# Patient Record
Sex: MALE | Race: WHITE | NOT HISPANIC OR LATINO | URBAN - METROPOLITAN AREA
[De-identification: names, ages, dates, MRNs, and addresses within clinical notes are randomized per-mention and may not be internally consistent; named-entity substitution may affect disease eponyms.]

---

## 2017-06-08 ENCOUNTER — OUTPATIENT (OUTPATIENT)
Dept: OUTPATIENT SERVICES | Facility: HOSPITAL | Age: 67
LOS: 1 days | End: 2017-06-08
Payer: COMMERCIAL

## 2017-06-08 ENCOUNTER — APPOINTMENT (OUTPATIENT)
Dept: THORACIC SURGERY | Facility: CLINIC | Age: 67
End: 2017-06-08

## 2017-06-08 VITALS
WEIGHT: 184 LBS | SYSTOLIC BLOOD PRESSURE: 119 MMHG | HEART RATE: 89 BPM | DIASTOLIC BLOOD PRESSURE: 64 MMHG | RESPIRATION RATE: 18 BRPM | HEIGHT: 72 IN | BODY MASS INDEX: 24.92 KG/M2 | TEMPERATURE: 98.7 F | OXYGEN SATURATION: 96 %

## 2017-06-08 DIAGNOSIS — J90 PLEURAL EFFUSION, NOT ELSEWHERE CLASSIFIED: ICD-10-CM

## 2017-06-08 LAB
ALBUMIN SERPL ELPH-MCNC: 3.9 G/DL — SIGNIFICANT CHANGE UP (ref 3.3–5)
ALP SERPL-CCNC: 81 U/L — SIGNIFICANT CHANGE UP (ref 40–120)
ALT FLD-CCNC: 17 U/L — SIGNIFICANT CHANGE UP (ref 10–45)
ANION GAP SERPL CALC-SCNC: 13 MMOL/L — SIGNIFICANT CHANGE UP (ref 5–17)
APPEARANCE UR: CLEAR — SIGNIFICANT CHANGE UP
APTT BLD: 32.4 SEC — SIGNIFICANT CHANGE UP (ref 27.5–37.4)
AST SERPL-CCNC: 21 U/L — SIGNIFICANT CHANGE UP (ref 10–40)
BASOPHILS NFR BLD AUTO: 0.2 % — SIGNIFICANT CHANGE UP (ref 0–2)
BILIRUB SERPL-MCNC: 0.3 MG/DL — SIGNIFICANT CHANGE UP (ref 0.2–1.2)
BILIRUB UR-MCNC: NEGATIVE — SIGNIFICANT CHANGE UP
BLD GP AB SCN SERPL QL: NEGATIVE — SIGNIFICANT CHANGE UP
BLD GP AB SCN SERPL QL: NEGATIVE — SIGNIFICANT CHANGE UP
BUN SERPL-MCNC: 17 MG/DL — SIGNIFICANT CHANGE UP (ref 7–23)
CALCIUM SERPL-MCNC: 9.1 MG/DL — SIGNIFICANT CHANGE UP (ref 8.4–10.5)
CHLORIDE SERPL-SCNC: 104 MMOL/L — SIGNIFICANT CHANGE UP (ref 96–108)
CHOLEST SERPL-MCNC: 144 MG/DL — SIGNIFICANT CHANGE UP (ref 10–199)
CO2 SERPL-SCNC: 25 MMOL/L — SIGNIFICANT CHANGE UP (ref 22–31)
COLOR SPEC: YELLOW — SIGNIFICANT CHANGE UP
CREAT SERPL-MCNC: 1.3 MG/DL — SIGNIFICANT CHANGE UP (ref 0.5–1.3)
DIFF PNL FLD: (no result)
EOSINOPHIL NFR BLD AUTO: 3.6 % — SIGNIFICANT CHANGE UP (ref 0–6)
EPI CELLS # UR: SIGNIFICANT CHANGE UP /HPF
GLUCOSE SERPL-MCNC: 85 MG/DL — SIGNIFICANT CHANGE UP (ref 70–99)
GLUCOSE UR QL: NEGATIVE — SIGNIFICANT CHANGE UP
HBA1C BLD-MCNC: 5.4 % — SIGNIFICANT CHANGE UP (ref 4–5.6)
HBV SURFACE AG SER-ACNC: SIGNIFICANT CHANGE UP
HCT VFR BLD CALC: 40.6 % — SIGNIFICANT CHANGE UP (ref 39–50)
HDLC SERPL-MCNC: 50 MG/DL — SIGNIFICANT CHANGE UP (ref 40–125)
HGB BLD-MCNC: 13.3 G/DL — SIGNIFICANT CHANGE UP (ref 13–17)
INR BLD: 1.09 — SIGNIFICANT CHANGE UP (ref 0.88–1.16)
KETONES UR-MCNC: NEGATIVE — SIGNIFICANT CHANGE UP
LEUKOCYTE ESTERASE UR-ACNC: NEGATIVE — SIGNIFICANT CHANGE UP
LIPID PNL WITH DIRECT LDL SERPL: 76 MG/DL — SIGNIFICANT CHANGE UP
LYMPHOCYTES # BLD AUTO: 15.3 % — SIGNIFICANT CHANGE UP (ref 13–44)
MCHC RBC-ENTMCNC: 27.4 PG — SIGNIFICANT CHANGE UP (ref 27–34)
MCHC RBC-ENTMCNC: 32.8 G/DL — SIGNIFICANT CHANGE UP (ref 32–36)
MCV RBC AUTO: 83.7 FL — SIGNIFICANT CHANGE UP (ref 80–100)
MONOCYTES NFR BLD AUTO: 13.8 % — SIGNIFICANT CHANGE UP (ref 2–14)
NEUTROPHILS NFR BLD AUTO: 67.1 % — SIGNIFICANT CHANGE UP (ref 43–77)
NITRITE UR-MCNC: NEGATIVE — SIGNIFICANT CHANGE UP
PH UR: 5 — SIGNIFICANT CHANGE UP (ref 5–8)
PLATELET # BLD AUTO: 359 K/UL — SIGNIFICANT CHANGE UP (ref 150–400)
POTASSIUM SERPL-MCNC: 4.3 MMOL/L — SIGNIFICANT CHANGE UP (ref 3.5–5.3)
POTASSIUM SERPL-SCNC: 4.3 MMOL/L — SIGNIFICANT CHANGE UP (ref 3.5–5.3)
PROT SERPL-MCNC: 6.9 G/DL — SIGNIFICANT CHANGE UP (ref 6–8.3)
PROT UR-MCNC: NEGATIVE MG/DL — SIGNIFICANT CHANGE UP
PROTHROM AB SERPL-ACNC: 12.1 SEC — SIGNIFICANT CHANGE UP (ref 9.8–12.7)
RBC # BLD: 4.85 M/UL — SIGNIFICANT CHANGE UP (ref 4.2–5.8)
RBC # FLD: 14.3 % — SIGNIFICANT CHANGE UP (ref 10.3–16.9)
RBC CASTS # UR COMP ASSIST: < 5 /HPF — SIGNIFICANT CHANGE UP
RH IG SCN BLD-IMP: POSITIVE — SIGNIFICANT CHANGE UP
RH IG SCN BLD-IMP: POSITIVE — SIGNIFICANT CHANGE UP
SODIUM SERPL-SCNC: 142 MMOL/L — SIGNIFICANT CHANGE UP (ref 135–145)
SP GR SPEC: 1.02 — SIGNIFICANT CHANGE UP (ref 1–1.03)
TOTAL CHOLESTEROL/HDL RATIO MEASUREMENT: 2.9 RATIO — LOW (ref 3.4–9.6)
TRIGL SERPL-MCNC: 92 MG/DL — SIGNIFICANT CHANGE UP (ref 10–149)
TSH SERPL-MCNC: 5.52 UIU/ML — HIGH (ref 0.35–4.94)
UROBILINOGEN FLD QL: 0.2 E.U./DL — SIGNIFICANT CHANGE UP
WBC # BLD: 10.1 K/UL — SIGNIFICANT CHANGE UP (ref 3.8–10.5)
WBC # FLD AUTO: 10.1 K/UL — SIGNIFICANT CHANGE UP (ref 3.8–10.5)
WBC UR QL: < 5 /HPF — SIGNIFICANT CHANGE UP

## 2017-06-08 PROCEDURE — 86850 RBC ANTIBODY SCREEN: CPT

## 2017-06-08 PROCEDURE — 81001 URINALYSIS AUTO W/SCOPE: CPT

## 2017-06-08 PROCEDURE — 80053 COMPREHEN METABOLIC PANEL: CPT

## 2017-06-08 PROCEDURE — 85610 PROTHROMBIN TIME: CPT

## 2017-06-08 PROCEDURE — 86901 BLOOD TYPING SEROLOGIC RH(D): CPT

## 2017-06-08 PROCEDURE — 83036 HEMOGLOBIN GLYCOSYLATED A1C: CPT

## 2017-06-08 PROCEDURE — 85730 THROMBOPLASTIN TIME PARTIAL: CPT

## 2017-06-08 PROCEDURE — 93010 ELECTROCARDIOGRAM REPORT: CPT

## 2017-06-08 PROCEDURE — 86900 BLOOD TYPING SEROLOGIC ABO: CPT

## 2017-06-08 PROCEDURE — 80061 LIPID PANEL: CPT

## 2017-06-08 PROCEDURE — 85025 COMPLETE CBC W/AUTO DIFF WBC: CPT

## 2017-06-08 PROCEDURE — 93005 ELECTROCARDIOGRAM TRACING: CPT

## 2017-06-08 PROCEDURE — 84443 ASSAY THYROID STIM HORMONE: CPT

## 2017-06-08 PROCEDURE — 87340 HEPATITIS B SURFACE AG IA: CPT

## 2017-06-12 ENCOUNTER — INPATIENT (INPATIENT)
Facility: HOSPITAL | Age: 67
LOS: 5 days | Discharge: HOME | DRG: 164 | End: 2017-06-18
Attending: SPECIALIST | Admitting: SPECIALIST
Payer: COMMERCIAL

## 2017-06-12 ENCOUNTER — RESULT REVIEW (OUTPATIENT)
Age: 67
End: 2017-06-12

## 2017-06-12 VITALS
SYSTOLIC BLOOD PRESSURE: 116 MMHG | TEMPERATURE: 98 F | WEIGHT: 185.85 LBS | DIASTOLIC BLOOD PRESSURE: 82 MMHG | HEART RATE: 72 BPM | OXYGEN SATURATION: 99 % | RESPIRATION RATE: 18 BRPM

## 2017-06-12 DIAGNOSIS — J90 PLEURAL EFFUSION, NOT ELSEWHERE CLASSIFIED: ICD-10-CM

## 2017-06-12 DIAGNOSIS — Z01.818 ENCOUNTER FOR OTHER PREPROCEDURAL EXAMINATION: ICD-10-CM

## 2017-06-12 DIAGNOSIS — E78.5 HYPERLIPIDEMIA, UNSPECIFIED: ICD-10-CM

## 2017-06-12 DIAGNOSIS — Z96.641 PRESENCE OF RIGHT ARTIFICIAL HIP JOINT: Chronic | ICD-10-CM

## 2017-06-12 DIAGNOSIS — Z98.890 OTHER SPECIFIED POSTPROCEDURAL STATES: Chronic | ICD-10-CM

## 2017-06-12 DIAGNOSIS — Q27.30 ARTERIOVENOUS MALFORMATION, SITE UNSPECIFIED: ICD-10-CM

## 2017-06-12 DIAGNOSIS — I10 ESSENTIAL (PRIMARY) HYPERTENSION: ICD-10-CM

## 2017-06-12 DIAGNOSIS — R91.8 OTHER NONSPECIFIC ABNORMAL FINDING OF LUNG FIELD: ICD-10-CM

## 2017-06-12 LAB
ALBUMIN SERPL ELPH-MCNC: 3.5 G/DL — SIGNIFICANT CHANGE UP (ref 3.3–5)
ALP SERPL-CCNC: 71 U/L — SIGNIFICANT CHANGE UP (ref 40–120)
ALT FLD-CCNC: 16 U/L — SIGNIFICANT CHANGE UP (ref 10–45)
ANION GAP SERPL CALC-SCNC: 12 MMOL/L — SIGNIFICANT CHANGE UP (ref 5–17)
APPEARANCE UR: CLEAR — SIGNIFICANT CHANGE UP
APTT BLD: 33.2 SEC — SIGNIFICANT CHANGE UP (ref 27.5–37.4)
AST SERPL-CCNC: 22 U/L — SIGNIFICANT CHANGE UP (ref 10–40)
BASOPHILS NFR BLD AUTO: 0.2 % — SIGNIFICANT CHANGE UP (ref 0–2)
BILIRUB SERPL-MCNC: 0.6 MG/DL — SIGNIFICANT CHANGE UP (ref 0.2–1.2)
BILIRUB UR-MCNC: NEGATIVE — SIGNIFICANT CHANGE UP
BUN SERPL-MCNC: 19 MG/DL — SIGNIFICANT CHANGE UP (ref 7–23)
CALCIUM SERPL-MCNC: 9.2 MG/DL — SIGNIFICANT CHANGE UP (ref 8.4–10.5)
CHLORIDE SERPL-SCNC: 100 MMOL/L — SIGNIFICANT CHANGE UP (ref 96–108)
CHOLEST SERPL-MCNC: 141 MG/DL — SIGNIFICANT CHANGE UP (ref 10–199)
CK MB CFR SERPL CALC: 1.7 NG/ML — SIGNIFICANT CHANGE UP (ref 0–6.7)
CK SERPL-CCNC: 62 U/L — SIGNIFICANT CHANGE UP (ref 30–200)
CO2 SERPL-SCNC: 26 MMOL/L — SIGNIFICANT CHANGE UP (ref 22–31)
COLOR SPEC: YELLOW — SIGNIFICANT CHANGE UP
CREAT SERPL-MCNC: 1.2 MG/DL — SIGNIFICANT CHANGE UP (ref 0.5–1.3)
DIFF PNL FLD: (no result)
EOSINOPHIL NFR BLD AUTO: 3.6 % — SIGNIFICANT CHANGE UP (ref 0–6)
GLUCOSE FLD-MCNC: 71 MG/DL — SIGNIFICANT CHANGE UP
GLUCOSE SERPL-MCNC: 93 MG/DL — SIGNIFICANT CHANGE UP (ref 70–99)
GLUCOSE UR QL: NEGATIVE — SIGNIFICANT CHANGE UP
HCT VFR BLD CALC: 39 % — SIGNIFICANT CHANGE UP (ref 39–50)
HDLC SERPL-MCNC: 45 MG/DL — SIGNIFICANT CHANGE UP (ref 40–125)
HGB BLD-MCNC: 12.7 G/DL — LOW (ref 13–17)
INR BLD: 1.12 — SIGNIFICANT CHANGE UP (ref 0.88–1.16)
KETONES UR-MCNC: 15 MG/DL
LDH SERPL L TO P-CCNC: 577 U/L — SIGNIFICANT CHANGE UP
LEUKOCYTE ESTERASE UR-ACNC: NEGATIVE — SIGNIFICANT CHANGE UP
LIPID PNL WITH DIRECT LDL SERPL: 78 MG/DL — SIGNIFICANT CHANGE UP
LYMPHOCYTES # BLD AUTO: 13.2 % — SIGNIFICANT CHANGE UP (ref 13–44)
MCHC RBC-ENTMCNC: 27.4 PG — SIGNIFICANT CHANGE UP (ref 27–34)
MCHC RBC-ENTMCNC: 32.6 G/DL — SIGNIFICANT CHANGE UP (ref 32–36)
MCV RBC AUTO: 84.1 FL — SIGNIFICANT CHANGE UP (ref 80–100)
MONOCYTES NFR BLD AUTO: 13.9 % — SIGNIFICANT CHANGE UP (ref 2–14)
NEUTROPHILS NFR BLD AUTO: 69.1 % — SIGNIFICANT CHANGE UP (ref 43–77)
NITRITE UR-MCNC: NEGATIVE — SIGNIFICANT CHANGE UP
PH UR: 6.5 — SIGNIFICANT CHANGE UP (ref 5–8)
PLATELET # BLD AUTO: 344 K/UL — SIGNIFICANT CHANGE UP (ref 150–400)
POTASSIUM SERPL-MCNC: 4.1 MMOL/L — SIGNIFICANT CHANGE UP (ref 3.5–5.3)
POTASSIUM SERPL-SCNC: 4.1 MMOL/L — SIGNIFICANT CHANGE UP (ref 3.5–5.3)
PROT FLD-MCNC: 4.4 G/DL — SIGNIFICANT CHANGE UP
PROT SERPL-MCNC: 7 G/DL — SIGNIFICANT CHANGE UP (ref 6–8.3)
PROT UR-MCNC: NEGATIVE MG/DL — SIGNIFICANT CHANGE UP
PROTHROM AB SERPL-ACNC: 12.5 SEC — SIGNIFICANT CHANGE UP (ref 9.8–12.7)
RBC # BLD: 4.64 M/UL — SIGNIFICANT CHANGE UP (ref 4.2–5.8)
RBC # FLD: 14.2 % — SIGNIFICANT CHANGE UP (ref 10.3–16.9)
SODIUM SERPL-SCNC: 138 MMOL/L — SIGNIFICANT CHANGE UP (ref 135–145)
SP GR SPEC: 1.01 — SIGNIFICANT CHANGE UP (ref 1–1.03)
SPECIMEN SOURCE FLD: SIGNIFICANT CHANGE UP
TOTAL CHOLESTEROL/HDL RATIO MEASUREMENT: 3.1 RATIO — LOW (ref 3.4–9.6)
TRIGL SERPL-MCNC: 88 MG/DL — SIGNIFICANT CHANGE UP (ref 10–149)
TROPONIN T SERPL-MCNC: <0.01 NG/ML — SIGNIFICANT CHANGE UP (ref 0–0.01)
TSH SERPL-MCNC: 3.63 UIU/ML — SIGNIFICANT CHANGE UP (ref 0.35–4.94)
UROBILINOGEN FLD QL: 0.2 E.U./DL — SIGNIFICANT CHANGE UP
WBC # BLD: 8.5 K/UL — SIGNIFICANT CHANGE UP (ref 3.8–10.5)
WBC # FLD AUTO: 8.5 K/UL — SIGNIFICANT CHANGE UP (ref 3.8–10.5)

## 2017-06-12 PROCEDURE — 32557 INSERT CATH PLEURA W/ IMAGE: CPT | Mod: RT

## 2017-06-12 PROCEDURE — 71250 CT THORAX DX C-: CPT | Mod: 26

## 2017-06-12 PROCEDURE — 99223 1ST HOSP IP/OBS HIGH 75: CPT | Mod: GC

## 2017-06-12 PROCEDURE — 94010 BREATHING CAPACITY TEST: CPT | Mod: 26

## 2017-06-12 PROCEDURE — 71010: CPT | Mod: 26

## 2017-06-12 PROCEDURE — 99285 EMERGENCY DEPT VISIT HI MDM: CPT

## 2017-06-12 RX ORDER — OXYCODONE HYDROCHLORIDE 5 MG/1
5 TABLET ORAL ONCE
Qty: 0 | Refills: 0 | Status: DISCONTINUED | OUTPATIENT
Start: 2017-06-12 | End: 2017-06-13

## 2017-06-12 RX ORDER — OXYCODONE HYDROCHLORIDE 5 MG/1
5 TABLET ORAL ONCE
Qty: 0 | Refills: 0 | Status: DISCONTINUED | OUTPATIENT
Start: 2017-06-12 | End: 2017-06-12

## 2017-06-12 RX ORDER — CHLORHEXIDINE GLUCONATE 213 G/1000ML
1 SOLUTION TOPICAL ONCE
Qty: 0 | Refills: 0 | Status: COMPLETED | OUTPATIENT
Start: 2017-06-12 | End: 2017-06-13

## 2017-06-12 RX ORDER — CHLORHEXIDINE GLUCONATE 213 G/1000ML
10 SOLUTION TOPICAL ONCE
Qty: 0 | Refills: 0 | Status: DISCONTINUED | OUTPATIENT
Start: 2017-06-12 | End: 2017-06-13

## 2017-06-12 RX ORDER — KETOROLAC TROMETHAMINE 30 MG/ML
30 SYRINGE (ML) INJECTION EVERY 4 HOURS
Qty: 0 | Refills: 0 | Status: DISCONTINUED | OUTPATIENT
Start: 2017-06-12 | End: 2017-06-12

## 2017-06-12 RX ORDER — SODIUM CHLORIDE 9 MG/ML
3 INJECTION INTRAMUSCULAR; INTRAVENOUS; SUBCUTANEOUS EVERY 8 HOURS
Qty: 0 | Refills: 0 | Status: DISCONTINUED | OUTPATIENT
Start: 2017-06-12 | End: 2017-06-13

## 2017-06-12 RX ORDER — LOSARTAN POTASSIUM 100 MG/1
25 TABLET, FILM COATED ORAL DAILY
Qty: 0 | Refills: 0 | Status: DISCONTINUED | OUTPATIENT
Start: 2017-06-12 | End: 2017-06-13

## 2017-06-12 RX ORDER — ATORVASTATIN CALCIUM 80 MG/1
20 TABLET, FILM COATED ORAL AT BEDTIME
Qty: 0 | Refills: 0 | Status: DISCONTINUED | OUTPATIENT
Start: 2017-06-12 | End: 2017-06-13

## 2017-06-12 RX ORDER — LEVOTHYROXINE SODIUM 125 MCG
200 TABLET ORAL DAILY
Qty: 0 | Refills: 0 | Status: DISCONTINUED | OUTPATIENT
Start: 2017-06-12 | End: 2017-06-13

## 2017-06-12 RX ORDER — ACETAMINOPHEN 500 MG
650 TABLET ORAL EVERY 6 HOURS
Qty: 0 | Refills: 0 | Status: DISCONTINUED | OUTPATIENT
Start: 2017-06-12 | End: 2017-06-13

## 2017-06-12 RX ADMIN — Medication 650 MILLIGRAM(S): at 20:07

## 2017-06-12 RX ADMIN — OXYCODONE HYDROCHLORIDE 5 MILLIGRAM(S): 5 TABLET ORAL at 21:37

## 2017-06-12 RX ADMIN — ATORVASTATIN CALCIUM 20 MILLIGRAM(S): 80 TABLET, FILM COATED ORAL at 21:37

## 2017-06-12 RX ADMIN — Medication 650 MILLIGRAM(S): at 21:05

## 2017-06-12 RX ADMIN — SODIUM CHLORIDE 3 MILLILITER(S): 9 INJECTION INTRAMUSCULAR; INTRAVENOUS; SUBCUTANEOUS at 14:47

## 2017-06-12 RX ADMIN — SODIUM CHLORIDE 3 MILLILITER(S): 9 INJECTION INTRAMUSCULAR; INTRAVENOUS; SUBCUTANEOUS at 21:37

## 2017-06-12 RX ADMIN — OXYCODONE HYDROCHLORIDE 5 MILLIGRAM(S): 5 TABLET ORAL at 22:15

## 2017-06-12 NOTE — H&P ADULT - NSHPREVIEWOFSYSTEMS_GEN_ALL_CORE
Review of Systems  CONSTITUTIONAL:  Denies Fevers / chills, sweats, fatigue, weight loss, weight gain                                      NEURO:  Denies parathesias, seizures, syncope, confusion                                                                                EYES:  Denies Blurry vision, discharge, pain, loss of vision                                                                                    ENMT:  Denies Difficulty hearing, vertigo, dysphagia, epistaxis, recent dental work                                       CV:  Denies Chest pain, palpitations, BAUM, orthopnea                                                                                          RESPIRATORY:  Denies Wheezing, SOB, cough / sputum, hemoptysis                                                                GI:  Denies Nausea, vommiting, diarrhea, constipation, melena, difficulty swallowing                                               : Denies Hematuria, dysuria, urgency, incontinence                                                                                         MUSKULOSKELETAL:  Denies arthritis, joint swelling, muscle weakness                                                             SKIN/BREAST:  Denies rash, itching, liyah loss, masses                                                                                            PSYCH:  Denies depresion, anxiety, suicidal ideation                                                                                               HEME/LYMPH:  Denies bruises easily, enlarged lymph nodes, tender lymph nodes                                        ENDOCRINE:  Denies cold intolerance, heat intolerance, polydipsia;

## 2017-06-12 NOTE — H&P ADULT - HISTORY OF PRESENT ILLNESS
41 y/o male with PMHx HTN, HLD, hypothyroidism, and right kidney AVM post IR embolization years ago at an OSH (with 3 coils per patient) who suffered from a mechanical fall in October 2016. States he was walking in his house at nighttime in the dark, and didn't want to wake his wife so didn't turn the lights on.  He tripped down the stairs and broke 2 right ribs.  He was found to have a pleural effusion after the fall that was 'watched' for a while by his out-patient doctors.  He then [facility] which drained ~1330cc (per patient) and looked 'bloody' when he saw the container.  He was then discharged home and upon follow up was found to have recurrent pleural effusion that he was told is related to his fall.  His wife is present and tells me that he has a mild cough at times, and his voice sounds 'gurgly'.  He denies any SOB, chest pain or palpitations.  His activity is not particularly limited over the past few weeks/months, although he states his wife 'doesn't let him' go to the gym b/c of his pleural effusion.  So he has been taking it easy lately.  No symptoms upon basic walking or climbing stairs.  He came through the ED today for evaluation and possible admission for surgery.    ***Of note, he tells me that secondary to his kidney AVM, his doctor told him that he should NEVER take NSAIDs*** 43 y/o male with PMHx HTN, HLD, hypothyroidism, and right kidney AVM post IR embolization years ago at an OSH (with 3 coils per patient) who suffered from a mechanical fall in October 2016. States he was walking in his house at nighttime in the dark, and didn't want to wake his wife so didn't turn the lights on.  He tripped down the stairs and broke 2 right ribs.  He was found to have a pleural effusion after the fall that was 'watched' for a while by his out-patient doctors.  He then had a thoracentesis  at [facility] on 5/25/17 which drained ~1330cc (per patient) and looked 'bloody' when he saw the container.  He was then discharged home and upon follow up was found to have recurrent pleural effusion that he was told is related to his fall.  His wife is present and tells me that he has a mild cough at times, and his voice sounds 'gurgly'.  He denies any SOB, chest pain or palpitations.  His activity is not particularly limited over the past few weeks/months, although he states his wife 'doesn't let him' go to the gym b/c of his pleural effusion.  So he has been taking it easy lately.  No symptoms upon basic walking or climbing stairs.  He came through the ED today for evaluation and possible admission for surgery.    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  He completed a course of Doxycycline.

## 2017-06-12 NOTE — ED ADULT TRIAGE NOTE - CHIEF COMPLAINT QUOTE
Pt presents c/o worsening BAUM since yesterday. Referred to ED by MD Milian.  Denies any fever, chills, endorses productive cough and mild chest pain.

## 2017-06-12 NOTE — H&P ADULT - NSHPPHYSICALEXAM_GEN_ALL_CORE
Physical Exam  CONSTITUTIONAL:                                                     NAD, appears well.  Not in respiratory distress.  NEURO:                                                               No focal deficits                   EYES:                                                                                WNL  ENMT:                                                                               WNL  CV:                                                                               S1S2 regular, no murmur heard  RESPIRATORY:                                                              Clear on the left; Right side diminished right middle lung, almost absent RLL  GI:                                                                                  Soft, non tender  : CERDA + / -                                                                No  MUSKULOSKELETAL:                                                       WNL  SKIN / BREAST:                                                                  WNL  EXT: No peripheral edema; warm and well perfused.

## 2017-06-12 NOTE — H&P ADULT - NSHPSOCIALHISTORY_GEN_ALL_CORE
Denies past or current smoker; Drinks wine (mostly on the weekends). Denied illicit drug use.  Lives with his wife.  Active male (until recently)

## 2017-06-12 NOTE — ED PROVIDER NOTE - MEDICAL DECISION MAKING DETAILS
67 yo male here because of recurrent right pleural effusion.  to be admitted Dr Marroquin for drainage.  labs, ekg and cxr ordered.  pt stable, comfortable

## 2017-06-12 NOTE — CONSULT NOTE ADULT - PROBLEM SELECTOR RECOMMENDATION 6
The patient's medical condition is optimized for surgery.  There is no contraindication for surgery.  There is no clinical evidence neither of angina, decompensated CHF, arrhthymias, nor valvular disease.   There is no limitation of exercise capacity.  MET is 7 .  ASA class is 2.  Perdomo cardiac risk factor is low  .  DVT prophylaxis is indicated.  Pain control.  Early mobilization.  Avoid fluid overload.

## 2017-06-12 NOTE — ED ADULT NURSE NOTE - CHPI ED SYMPTOMS NEG
no vomiting/no cough/no back pain/no dizziness/no chest pain/no diaphoresis/no fever/no nausea/no syncope/no chills

## 2017-06-12 NOTE — PROGRESS NOTE ADULT - ASSESSMENT
65 y/o male with HTN, HLD, and recurrent pleural effusion possibly 2/2 mechanical fall 8 months ago.

## 2017-06-12 NOTE — ED ADULT NURSE NOTE - OBJECTIVE STATEMENT
67 y/o male c/o SOB worse on exertion. PMH pleural effusion with thoracentesis May 25, 2017. PMH HTN, HLD. no acute distress, speaking in full sentences without SOB, VS stable. denies chest pain, headache, n/v/d, fever/chills. resting comfortably in stretcher awaiting further evaluation.

## 2017-06-12 NOTE — H&P ADULT - PMH
AVM (arteriovenous malformation)  in the KIDNEY  Hip pain, chronic, right    HLD (hyperlipidemia)    HTN (hypertension)    Pleural effusion

## 2017-06-12 NOTE — H&P ADULT - PROBLEM SELECTOR PLAN 1
IR to place right pigtail for moderate-large pleural effusion.  Pt to have CT chest after his pigtail.  Then to the OR tomorrow for right VATS, drainage of pleural effusion, decortication, pleural biopsy.   NPO after midnight.  Pre-op orders/consent done.  Pre-op checklist to follow.  PFTs ordered.  Patient can eat after his pigtail.  Dr. Cheng asked to consult for medical clearance.

## 2017-06-12 NOTE — PROGRESS NOTE ADULT - SUBJECTIVE AND OBJECTIVE BOX
Planned Date of Surgery:  6/13/17                                                                                                                Surgeon: Dr. Jha    Procedure: Right VATS, drainage of pleural effusion, decortication, pleural biopsy    HPI:  66y Male with PMHx kidney AVM s/p coils, HLD, HTN, right hip replacement, had a mechanical fall in Oct 2016 and subsequently developed a pleural effusion.  Had a thoracentesis in May 2017 (1300cc per patient).  Now here wtih recurrent pleural effusion on right side which has been followed as an out patient. He was seen by Dr. Jha in the office and deemed a candidate for VATS.  He currently denies any SOB, chest pain, palpitations, F/C, N/V/D.      PAST MEDICAL & SURGICAL HISTORY:  Hip pain, chronic, right  AVM (arteriovenous malformation): in the KIDNEY  Pleural effusion  HLD (hyperlipidemia)  HTN (hypertension)  History of thoracentesis: May 2017  History of hip replacement, total, right      codeine (Rash)  NSAID (Nephrotoxicity)  sulfa drugs (Unknown)      MEDICATIONS  (STANDING):  sodium chloride 0.9% lock flush 3milliLiter(s) IV Push every 8 hours  losartan 25milliGRAM(s) Oral daily  atorvastatin 20milliGRAM(s) Oral at bedtime  levothyroxine 200MICROGram(s) Oral daily  chlorhexidine 4% Liquid 1Application(s) Topical once  chlorhexidine 0.12% Liquid 10milliLiter(s) Swish and Spit once    MEDICATIONS  (PRN):  acetaminophen   Tablet. 650milliGRAM(s) Oral every 6 hours PRN Mild Pain (1 - 3)      Labs:                        12.7   8.5   )-----------( 344      ( 12 Jun 2017 09:21 )             39.0     06-12    138  |  100  |  19  ----------------------------<  93  4.1   |  26  |  1.20    Ca    9.2      12 Jun 2017 09:21    TPro  7.0  /  Alb  3.5  /  TBili  0.6  /  DBili  x   /  AST  22  /  ALT  16  /  AlkPhos  71  06-12    PT/INR - ( 12 Jun 2017 09:21 )   PT: 12.5 sec;   INR: 1.12          PTT - ( 12 Jun 2017 09:21 )  PTT:33.2 sec        CARDIAC MARKERS ( 12 Jun 2017 09:21 )  x     / <0.01 ng/mL / 62 U/L / x     / 1.7 ng/mL          Hgb A1C: pending    EKG: NSR at 92 BPM. No BROOKE.    CXR: Right moderate/large pleural effusion.  No other abnormalities seen    CT Scans: CT chest pending this afternoon    Cath Report: Not required    Echo: To be done tomorrow am.     PFT's: Done (not reviewed yet, patient is off the floor    Consult in Chart?  No (H&P on chart)  Consent in Chart? YES  Pre-op Orders Placed? YES   Blood Prodeucts Ordered? YES   NPO ordered? YES

## 2017-06-12 NOTE — ED PROVIDER NOTE - OBJECTIVE STATEMENT
sob for past few days.  has known pleural effusion.  had it drained a few weeks ago.  thought to be from trauma, had a fall with rib fx a few months ago

## 2017-06-12 NOTE — H&P ADULT - ASSESSMENT
67 y/o male with a mechanical fall months ago with recurrent right pleural effusion.  Here for right IR U/S-guided pigtail today (pt NPO since 6am), then surgery tomorrow for VATS/Decort.

## 2017-06-13 ENCOUNTER — APPOINTMENT (OUTPATIENT)
Dept: THORACIC SURGERY | Facility: HOSPITAL | Age: 67
End: 2017-06-13

## 2017-06-13 ENCOUNTER — RESULT REVIEW (OUTPATIENT)
Age: 67
End: 2017-06-13

## 2017-06-13 LAB
ALBUMIN FLD-MCNC: 2.7 G/DL — SIGNIFICANT CHANGE UP
ANION GAP SERPL CALC-SCNC: 15 MMOL/L — SIGNIFICANT CHANGE UP (ref 5–17)
APTT BLD: 29.2 SEC — SIGNIFICANT CHANGE UP (ref 27.5–37.4)
B PERT IGG+IGM PNL SER: SIGNIFICANT CHANGE UP
BASE EXCESS BLDA CALC-SCNC: -2.3 MMOL/L — LOW (ref -2–3)
BASOPHILS NFR BLD AUTO: 0.1 % — SIGNIFICANT CHANGE UP (ref 0–2)
BLD GP AB SCN SERPL QL: NEGATIVE — SIGNIFICANT CHANGE UP
BUN SERPL-MCNC: 17 MG/DL — SIGNIFICANT CHANGE UP (ref 7–23)
CALCIUM SERPL-MCNC: 8.8 MG/DL — SIGNIFICANT CHANGE UP (ref 8.4–10.5)
CHLORIDE SERPL-SCNC: 100 MMOL/L — SIGNIFICANT CHANGE UP (ref 96–108)
CO2 SERPL-SCNC: 21 MMOL/L — LOW (ref 22–31)
COLOR FLD: SIGNIFICANT CHANGE UP
CREAT SERPL-MCNC: 1.2 MG/DL — SIGNIFICANT CHANGE UP (ref 0.5–1.3)
EOSINOPHIL # FLD: 7 % — SIGNIFICANT CHANGE UP
EOSINOPHIL NFR BLD AUTO: 0.4 % — SIGNIFICANT CHANGE UP (ref 0–6)
EXTRA SST TUBE: SIGNIFICANT CHANGE UP
FLUID INTAKE SUBSTANCE CLASS: SIGNIFICANT CHANGE UP
FLUID SEGMENTED GRANULOCYTES: 3 % — SIGNIFICANT CHANGE UP
GAS PNL BLDA: SIGNIFICANT CHANGE UP
GLUCOSE SERPL-MCNC: 128 MG/DL — HIGH (ref 70–99)
GRAM STN FLD: SIGNIFICANT CHANGE UP
HCO3 BLDA-SCNC: 22 MMOL/L — SIGNIFICANT CHANGE UP (ref 21–28)
HCT VFR BLD CALC: 38.6 % — LOW (ref 39–50)
HGB BLD-MCNC: 12.7 G/DL — LOW (ref 13–17)
INR BLD: 1.15 — SIGNIFICANT CHANGE UP (ref 0.88–1.16)
LYMPHOCYTES # BLD AUTO: 3.7 % — LOW (ref 13–44)
LYMPHOCYTES # FLD: 77 % — SIGNIFICANT CHANGE UP
MCHC RBC-ENTMCNC: 27.5 PG — SIGNIFICANT CHANGE UP (ref 27–34)
MCHC RBC-ENTMCNC: 32.9 G/DL — SIGNIFICANT CHANGE UP (ref 32–36)
MCV RBC AUTO: 83.7 FL — SIGNIFICANT CHANGE UP (ref 80–100)
MONOCYTES NFR BLD AUTO: 3.1 % — SIGNIFICANT CHANGE UP (ref 2–14)
MONOS+MACROS # FLD: 13 % — SIGNIFICANT CHANGE UP
NEUTROPHILS NFR BLD AUTO: 92.7 % — HIGH (ref 43–77)
PCO2 BLDA: 38 MMHG — SIGNIFICANT CHANGE UP (ref 35–48)
PH BLDA: 7.39 — SIGNIFICANT CHANGE UP (ref 7.35–7.45)
PLATELET # BLD AUTO: 353 K/UL — SIGNIFICANT CHANGE UP (ref 150–400)
PO2 BLDA: 152 MMHG — HIGH (ref 83–108)
POTASSIUM SERPL-MCNC: 4.5 MMOL/L — SIGNIFICANT CHANGE UP (ref 3.5–5.3)
POTASSIUM SERPL-SCNC: 4.5 MMOL/L — SIGNIFICANT CHANGE UP (ref 3.5–5.3)
PROTHROM AB SERPL-ACNC: 12.8 SEC — HIGH (ref 9.8–12.7)
RBC # BLD: 4.61 M/UL — SIGNIFICANT CHANGE UP (ref 4.2–5.8)
RBC # FLD: 14.1 % — SIGNIFICANT CHANGE UP (ref 10.3–16.9)
RCV VOL RI: HIGH /UL (ref 0–5)
RH IG SCN BLD-IMP: POSITIVE — SIGNIFICANT CHANGE UP
RH IG SCN BLD-IMP: POSITIVE — SIGNIFICANT CHANGE UP
SAO2 % BLDA: 99 % — SIGNIFICANT CHANGE UP (ref 95–100)
SODIUM SERPL-SCNC: 136 MMOL/L — SIGNIFICANT CHANGE UP (ref 135–145)
SPECIMEN SOURCE FLD: SIGNIFICANT CHANGE UP
SPECIMEN SOURCE: SIGNIFICANT CHANGE UP
TOTAL NUCLEATED CELL COUNT, BODY FLUID: 2760 /UL — HIGH (ref 0–5)
TUBE TYPE: SIGNIFICANT CHANGE UP
WBC # BLD: 13.6 K/UL — HIGH (ref 3.8–10.5)
WBC # FLD AUTO: 13.6 K/UL — HIGH (ref 3.8–10.5)

## 2017-06-13 PROCEDURE — 71010: CPT | Mod: 26

## 2017-06-13 PROCEDURE — 93010 ELECTROCARDIOGRAM REPORT: CPT

## 2017-06-13 PROCEDURE — 93306 TTE W/DOPPLER COMPLETE: CPT | Mod: 26

## 2017-06-13 RX ORDER — HEPARIN SODIUM 5000 [USP'U]/ML
5000 INJECTION INTRAVENOUS; SUBCUTANEOUS EVERY 8 HOURS
Qty: 0 | Refills: 0 | Status: DISCONTINUED | OUTPATIENT
Start: 2017-06-13 | End: 2017-06-18

## 2017-06-13 RX ORDER — OXYCODONE HYDROCHLORIDE 5 MG/1
10 TABLET ORAL EVERY 12 HOURS
Qty: 0 | Refills: 0 | Status: DISCONTINUED | OUTPATIENT
Start: 2017-06-13 | End: 2017-06-14

## 2017-06-13 RX ORDER — SODIUM CHLORIDE 9 MG/ML
1000 INJECTION, SOLUTION INTRAVENOUS
Qty: 0 | Refills: 0 | Status: DISCONTINUED | OUTPATIENT
Start: 2017-06-13 | End: 2017-06-15

## 2017-06-13 RX ORDER — MORPHINE SULFATE 50 MG/1
4 CAPSULE, EXTENDED RELEASE ORAL EVERY 4 HOURS
Qty: 0 | Refills: 0 | Status: DISCONTINUED | OUTPATIENT
Start: 2017-06-13 | End: 2017-06-14

## 2017-06-13 RX ORDER — ALBUMIN HUMAN 25 %
100 VIAL (ML) INTRAVENOUS ONCE
Qty: 0 | Refills: 0 | Status: DISCONTINUED | OUTPATIENT
Start: 2017-06-13 | End: 2017-06-14

## 2017-06-13 RX ORDER — ALBUMIN HUMAN 25 %
500 VIAL (ML) INTRAVENOUS ONCE
Qty: 0 | Refills: 0 | Status: DISCONTINUED | OUTPATIENT
Start: 2017-06-13 | End: 2017-06-14

## 2017-06-13 RX ORDER — FAMOTIDINE 10 MG/ML
20 INJECTION INTRAVENOUS DAILY
Qty: 0 | Refills: 0 | Status: DISCONTINUED | OUTPATIENT
Start: 2017-06-13 | End: 2017-06-14

## 2017-06-13 RX ORDER — ACETAMINOPHEN 500 MG
1000 TABLET ORAL ONCE
Qty: 0 | Refills: 0 | Status: COMPLETED | OUTPATIENT
Start: 2017-06-13 | End: 2017-06-13

## 2017-06-13 RX ADMIN — FAMOTIDINE 20 MILLIGRAM(S): 10 INJECTION INTRAVENOUS at 18:18

## 2017-06-13 RX ADMIN — OXYCODONE HYDROCHLORIDE 5 MILLIGRAM(S): 5 TABLET ORAL at 03:00

## 2017-06-13 RX ADMIN — Medication 200 MICROGRAM(S): at 06:19

## 2017-06-13 RX ADMIN — CHLORHEXIDINE GLUCONATE 1 APPLICATION(S): 213 SOLUTION TOPICAL at 10:53

## 2017-06-13 RX ADMIN — Medication 400 MILLIGRAM(S): at 18:13

## 2017-06-13 RX ADMIN — OXYCODONE HYDROCHLORIDE 5 MILLIGRAM(S): 5 TABLET ORAL at 01:18

## 2017-06-13 RX ADMIN — Medication 1000 MILLIGRAM(S): at 20:10

## 2017-06-13 RX ADMIN — HEPARIN SODIUM 5000 UNIT(S): 5000 INJECTION INTRAVENOUS; SUBCUTANEOUS at 22:00

## 2017-06-13 RX ADMIN — SODIUM CHLORIDE 3 MILLILITER(S): 9 INJECTION INTRAMUSCULAR; INTRAVENOUS; SUBCUTANEOUS at 06:19

## 2017-06-13 RX ADMIN — LOSARTAN POTASSIUM 25 MILLIGRAM(S): 100 TABLET, FILM COATED ORAL at 06:22

## 2017-06-13 NOTE — PROGRESS NOTE ADULT - ASSESSMENT
65 y/o M with recurrent R pleural effusions s/p R VATS RA decortication   - keep chest tubes to suction   - f/u repeat CXR in AM   - c/w current pain regimen   - pepcid for GI ppx   - HSQ/SCD for DVT ppx   - zarco out in AM   - ADAT

## 2017-06-13 NOTE — PROGRESS NOTE ADULT - SUBJECTIVE AND OBJECTIVE BOX
Patient discussed on morning rounds with       Operation / Date:     SUBJECTIVE ASSESSMENT:  66y Male         Vital Signs Last 24 Hrs  T(C): 36.3, Max: 37.3 ( @ 19:48)  T(F): 97.3, Max: 99.2 ( @ 19:48)  HR: 78 (56 - 85)  BP: 113/71 (102/75 - 125/65)  BP(mean): 85 (85 - 90)  RR: 18 (12 - 18)  SpO2: 100% (94% - 100%)  I&O's Detail    I & Os for current day (as of 2017 18:04)  =============================================  IN:    Total IN: 0 ml  ---------------------------------------------  OUT:    Chest Tube: 310 ml    Total OUT: 310 ml  ---------------------------------------------  Total NET: -310 ml      CHEST TUBE:  Yes/No. AIR LEAKS: Yes/No. Suction / H2O SEAL.   SEAN DRAIN:  Yes/No.  EPICARDIAL WIRES: Yes/No.  TIE DOWNS: Yes/No.  CERDA: Yes/No.    PHYSICAL EXAM:    General:     Neurological:    Cardiovascular:    Respiratory:    Gastrointestinal:    Extremities:    Vascular:    Incision Sites:    LABS:                        12.7   13.6  )-----------( 353      ( 2017 17:15 )             38.6       COUMADIN:  Yes/No. REASON: .    PT/INR - ( 2017 17:15 )   PT: 12.8 sec;   INR: 1.15          PTT - ( 2017 17:15 )  PTT:29.2 sec        136  |  100  |  17  ----------------------------<  128<H>  4.5   |  21<L>  |  1.20    Ca    8.8      2017 17:15    TPro  7.0  /  Alb  3.5  /  TBili  0.6  /  DBili  x   /  AST  22  /  ALT  16  /  AlkPhos  71  06-12      Urinalysis Basic - ( 2017 18:06 )    Color: Yellow / Appearance: Clear / S.010 / pH: x  Gluc: x / Ketone: 15 mg/dL  / Bili: NEGATIVE / Urobili: 0.2 E.U./dL   Blood: x / Protein: NEGATIVE mg/dL / Nitrite: NEGATIVE   Leuk Esterase: NEGATIVE / RBC: < 5 /HPF / WBC < 5 /HPF   Sq Epi: x / Non Sq Epi: Rare /HPF / Bacteria: Present /HPF        MEDICATIONS  (STANDING):  albumin human  5% IVPB 500milliLiter(s) IV Intermittent once  albumin human 25% IVPB 100milliLiter(s) IV Intermittent once  lactated ringers. 1000milliLiter(s) IV Continuous <Continuous>  famotidine Injectable 20milliGRAM(s) IV Push daily  oxyCODONE ER Tablet 10milliGRAM(s) Oral every 12 hours  acetaminophen  IVPB. 1000milliGRAM(s) IV Intermittent once  heparin  Injectable 5000Unit(s) SubCutaneous every 8 hours    MEDICATIONS  (PRN):  morphine  - Injectable 4milliGRAM(s) IV Push every 4 hours PRN Severe Pain (7 - 10)        RADIOLOGY & ADDITIONAL TESTS: Patient discussed on morning rounds with Dr. Jha     Operation / Date: R VATS RA decortication    SUBJECTIVE ASSESSMENT:    Pt reports pain well controlled after a dose of IV tylenol.  He feels "bubbling" in chest but denies dyspnea or SOB.  He is pulling up to 1500 on IS and has no other present concerns.    Vital Signs Last 24 Hrs  T(C): 36.3, Max: 37.3 ( @ 19:48)  T(F): 97.3, Max: 99.2 ( @ 19:48)  HR: 78 (56 - 85)  BP: 113/71 (102/75 - 125/65)  BP(mean): 85 (85 - 90)  RR: 18 (12 - 18)  SpO2: 100% (94% - 100%)  I&O's Detail    I & Os for current day (as of 2017 18:04)  =============================================  IN:    Total IN: 0 ml  ---------------------------------------------  OUT:    Chest Tube: 310 ml    Total OUT: 310 ml  ---------------------------------------------  Total NET: -310 ml      CHEST TUBE:  Yes. AIR LEAKS: Yes on Suction   SEAN DRAIN:  No.  EPICARDIAL WIRES: No.  TIE DOWNS: No.  CERDA: Yes.    PHYSICAL EXAM:    General: NAD    Neurological: A&Ox3     Cardiovascular: S1S2 RRR    Respiratory: CTA b/l no W/R/R    Gastrointestinal: soft NT/ND +BS    Extremities: no edema    Vascular: warm and well perfused bilaterally    Incision Sites: R VATS incisions C/D/I    LABS:                        12.7   13.6  )-----------( 353      ( 2017 17:15 )             38.6       COUMADIN:  No.     PT/INR - ( 2017 17:15 )   PT: 12.8 sec;   INR: 1.15          PTT - ( 2017 17:15 )  PTT:29.2 sec        136  |  100  |  17  ----------------------------<  128<H>  4.5   |  21<L>  |  1.20    Ca    8.8      2017 17:15    TPro  7.0  /  Alb  3.5  /  TBili  0.6  /  DBili  x   /  AST  22  /  ALT  16  /  AlkPhos  71  06-12      Urinalysis Basic - ( 2017 18:06 )    Color: Yellow / Appearance: Clear / S.010 / pH: x  Gluc: x / Ketone: 15 mg/dL  / Bili: NEGATIVE / Urobili: 0.2 E.U./dL   Blood: x / Protein: NEGATIVE mg/dL / Nitrite: NEGATIVE   Leuk Esterase: NEGATIVE / RBC: < 5 /HPF / WBC < 5 /HPF   Sq Epi: x / Non Sq Epi: Rare /HPF / Bacteria: Present /HPF        MEDICATIONS  (STANDING):  albumin human  5% IVPB 500milliLiter(s) IV Intermittent once  albumin human 25% IVPB 100milliLiter(s) IV Intermittent once  lactated ringers. 1000milliLiter(s) IV Continuous <Continuous>  famotidine Injectable 20milliGRAM(s) IV Push daily  oxyCODONE ER Tablet 10milliGRAM(s) Oral every 12 hours  acetaminophen  IVPB. 1000milliGRAM(s) IV Intermittent once  heparin  Injectable 5000Unit(s) SubCutaneous every 8 hours    MEDICATIONS  (PRN):  morphine  - Injectable 4milliGRAM(s) IV Push every 4 hours PRN Severe Pain (7 - 10)        RADIOLOGY & ADDITIONAL TESTS: no effusions/infiltrates/PTX

## 2017-06-13 NOTE — PACU DISCHARGE NOTE - COMMENTS
pt aao x3.  VSS.  right chest tube 28fr x2 to sxn with sanguinous drainage.  denies c/o pain or SOB at present.  report given to RN Francine on9 lachman.  pt to go to [phone]-window via bed on monitor with RN transport

## 2017-06-14 LAB
ANION GAP SERPL CALC-SCNC: 15 MMOL/L — SIGNIFICANT CHANGE UP (ref 5–17)
APTT BLD: 29.6 SEC — SIGNIFICANT CHANGE UP (ref 27.5–37.4)
BUN SERPL-MCNC: 18 MG/DL — SIGNIFICANT CHANGE UP (ref 7–23)
CALCIUM SERPL-MCNC: 9.9 MG/DL — SIGNIFICANT CHANGE UP (ref 8.4–10.5)
CHLORIDE SERPL-SCNC: 96 MMOL/L — SIGNIFICANT CHANGE UP (ref 96–108)
CO2 SERPL-SCNC: 26 MMOL/L — SIGNIFICANT CHANGE UP (ref 22–31)
CREAT SERPL-MCNC: 1.2 MG/DL — SIGNIFICANT CHANGE UP (ref 0.5–1.3)
CULTURE RESULTS: NO GROWTH — SIGNIFICANT CHANGE UP
GLUCOSE SERPL-MCNC: 92 MG/DL — SIGNIFICANT CHANGE UP (ref 70–99)
HCT VFR BLD CALC: 41.7 % — SIGNIFICANT CHANGE UP (ref 39–50)
HGB BLD-MCNC: 14 G/DL — SIGNIFICANT CHANGE UP (ref 13–17)
INR BLD: 1.1 — SIGNIFICANT CHANGE UP (ref 0.88–1.16)
MAGNESIUM SERPL-MCNC: 2.1 MG/DL — SIGNIFICANT CHANGE UP (ref 1.6–2.6)
MCHC RBC-ENTMCNC: 28.4 PG — SIGNIFICANT CHANGE UP (ref 27–34)
MCHC RBC-ENTMCNC: 33.6 G/DL — SIGNIFICANT CHANGE UP (ref 32–36)
MCV RBC AUTO: 84.6 FL — SIGNIFICANT CHANGE UP (ref 80–100)
NON-GYNECOLOGICAL CYTOLOGY STUDY: SIGNIFICANT CHANGE UP
PHOSPHATE SERPL-MCNC: 2.8 MG/DL — SIGNIFICANT CHANGE UP (ref 2.5–4.5)
PLATELET # BLD AUTO: 392 K/UL — SIGNIFICANT CHANGE UP (ref 150–400)
POTASSIUM SERPL-MCNC: 4.2 MMOL/L — SIGNIFICANT CHANGE UP (ref 3.5–5.3)
POTASSIUM SERPL-SCNC: 4.2 MMOL/L — SIGNIFICANT CHANGE UP (ref 3.5–5.3)
PROTHROM AB SERPL-ACNC: 12.2 SEC — SIGNIFICANT CHANGE UP (ref 9.8–12.7)
RBC # BLD: 4.93 M/UL — SIGNIFICANT CHANGE UP (ref 4.2–5.8)
RBC # FLD: 14.2 % — SIGNIFICANT CHANGE UP (ref 10.3–16.9)
SODIUM SERPL-SCNC: 137 MMOL/L — SIGNIFICANT CHANGE UP (ref 135–145)
SPECIMEN SOURCE: SIGNIFICANT CHANGE UP
WBC # BLD: 20.1 K/UL — HIGH (ref 3.8–10.5)
WBC # FLD AUTO: 20.1 K/UL — HIGH (ref 3.8–10.5)

## 2017-06-14 PROCEDURE — 71010: CPT | Mod: 26

## 2017-06-14 PROCEDURE — 99233 SBSQ HOSP IP/OBS HIGH 50: CPT | Mod: GC

## 2017-06-14 RX ORDER — SENNA PLUS 8.6 MG/1
2 TABLET ORAL AT BEDTIME
Qty: 0 | Refills: 0 | Status: DISCONTINUED | OUTPATIENT
Start: 2017-06-14 | End: 2017-06-18

## 2017-06-14 RX ORDER — OXYCODONE HYDROCHLORIDE 5 MG/1
5 TABLET ORAL EVERY 6 HOURS
Qty: 0 | Refills: 0 | Status: DISCONTINUED | OUTPATIENT
Start: 2017-06-14 | End: 2017-06-18

## 2017-06-14 RX ORDER — POLYETHYLENE GLYCOL 3350 17 G/17G
17 POWDER, FOR SOLUTION ORAL DAILY
Qty: 0 | Refills: 0 | Status: DISCONTINUED | OUTPATIENT
Start: 2017-06-14 | End: 2017-06-18

## 2017-06-14 RX ORDER — ACETAMINOPHEN 500 MG
650 TABLET ORAL EVERY 6 HOURS
Qty: 0 | Refills: 0 | Status: DISCONTINUED | OUTPATIENT
Start: 2017-06-14 | End: 2017-06-18

## 2017-06-14 RX ORDER — LEVOTHYROXINE SODIUM 125 MCG
200 TABLET ORAL DAILY
Qty: 0 | Refills: 0 | Status: DISCONTINUED | OUTPATIENT
Start: 2017-06-14 | End: 2017-06-18

## 2017-06-14 RX ORDER — ATORVASTATIN CALCIUM 80 MG/1
40 TABLET, FILM COATED ORAL AT BEDTIME
Qty: 0 | Refills: 0 | Status: DISCONTINUED | OUTPATIENT
Start: 2017-06-14 | End: 2017-06-14

## 2017-06-14 RX ORDER — OXYCODONE HYDROCHLORIDE 5 MG/1
10 TABLET ORAL EVERY 6 HOURS
Qty: 0 | Refills: 0 | Status: DISCONTINUED | OUTPATIENT
Start: 2017-06-14 | End: 2017-06-18

## 2017-06-14 RX ORDER — ACETAMINOPHEN 500 MG
650 TABLET ORAL ONCE
Qty: 0 | Refills: 0 | Status: COMPLETED | OUTPATIENT
Start: 2017-06-14 | End: 2017-06-14

## 2017-06-14 RX ORDER — DOCUSATE SODIUM 100 MG
100 CAPSULE ORAL THREE TIMES A DAY
Qty: 0 | Refills: 0 | Status: DISCONTINUED | OUTPATIENT
Start: 2017-06-14 | End: 2017-06-18

## 2017-06-14 RX ORDER — PANTOPRAZOLE SODIUM 20 MG/1
40 TABLET, DELAYED RELEASE ORAL
Qty: 0 | Refills: 0 | Status: DISCONTINUED | OUTPATIENT
Start: 2017-06-14 | End: 2017-06-18

## 2017-06-14 RX ADMIN — OXYCODONE HYDROCHLORIDE 10 MILLIGRAM(S): 5 TABLET ORAL at 06:30

## 2017-06-14 RX ADMIN — OXYCODONE HYDROCHLORIDE 10 MILLIGRAM(S): 5 TABLET ORAL at 05:53

## 2017-06-14 RX ADMIN — HEPARIN SODIUM 5000 UNIT(S): 5000 INJECTION INTRAVENOUS; SUBCUTANEOUS at 05:53

## 2017-06-14 RX ADMIN — Medication 650 MILLIGRAM(S): at 06:00

## 2017-06-14 RX ADMIN — OXYCODONE HYDROCHLORIDE 5 MILLIGRAM(S): 5 TABLET ORAL at 22:05

## 2017-06-14 RX ADMIN — HEPARIN SODIUM 5000 UNIT(S): 5000 INJECTION INTRAVENOUS; SUBCUTANEOUS at 21:55

## 2017-06-14 RX ADMIN — PANTOPRAZOLE SODIUM 40 MILLIGRAM(S): 20 TABLET, DELAYED RELEASE ORAL at 11:50

## 2017-06-14 RX ADMIN — SENNA PLUS 2 TABLET(S): 8.6 TABLET ORAL at 21:55

## 2017-06-14 RX ADMIN — Medication 100 MILLIGRAM(S): at 14:02

## 2017-06-14 RX ADMIN — Medication 200 MICROGRAM(S): at 14:39

## 2017-06-14 RX ADMIN — OXYCODONE HYDROCHLORIDE 10 MILLIGRAM(S): 5 TABLET ORAL at 22:35

## 2017-06-14 RX ADMIN — OXYCODONE HYDROCHLORIDE 5 MILLIGRAM(S): 5 TABLET ORAL at 22:02

## 2017-06-14 RX ADMIN — Medication 100 MILLIGRAM(S): at 21:55

## 2017-06-14 RX ADMIN — HEPARIN SODIUM 5000 UNIT(S): 5000 INJECTION INTRAVENOUS; SUBCUTANEOUS at 14:02

## 2017-06-14 RX ADMIN — Medication 325 MILLIGRAM(S): at 05:00

## 2017-06-14 NOTE — PROGRESS NOTE ADULT - SUBJECTIVE AND OBJECTIVE BOX
Patient discussed on morning rounds with Dr. Jha       Operation / Date: 17 Right VATS decortiaction    SUBJECTIVE ASSESSMENT:  Patient feeling well with minimal discomfort.  Feeling numbess just medial to VATS incisions and CT sites.  Tolerating clear diet this am. Denies HA, dizziness, CP, SOB, cough, palpitations, N/V/D/C        Vital Signs Last 24 Hrs  T(C): 36.8, Max: 37.3 ( @ 19:29)  T(F): 98.2, Max: 99.1 ( @ 19:29)  HR: 102 (14 - 102)  BP: 125/72 (100/75 - 125/72)  BP(mean): 99 (85 - 99)  RR: 17 (12 - 24)  SpO2: 98% (98% - 100%)  I&O's Detail  I & Os for 24h ending 2017 07:00  =============================================  IN:    Oral Fluid: 120 ml    lactated ringers.: 90 ml    Total IN: 210 ml  ---------------------------------------------  OUT:    Voided: 750 ml    Chest Tube: 200 ml    Chest Tube: 30 ml    Total OUT: 980 ml  ---------------------------------------------  Total NET: -770 ml    I & Os for current day (as of 2017 16:27)  =============================================  IN:    Total IN: 0 ml  ---------------------------------------------  OUT:    Voided: 950 ml    Chest Tube: 32 ml    Chest Tube: 10 ml    Total OUT: 992 ml  ---------------------------------------------  Total NET: -992 ml      CHEST TUBE:  Yes AIR LEAKS: Yes. Suction at -20 mmHg  SEAN DRAIN:  No.  EPICARDIAL WIRES: No.  TIE DOWNS: No.  CERDA: No.    PHYSICAL EXAM:    General: OOB in chair, comfortable.  NAD     Neurological: A&O x 3 moves all extremities, no focal deficits.      Cardiovascular: S1S2 RRR No M/G/R    Respiratory: crackles at right base.  Left lung CTA, no w/r/r.  No palpable crepitus     Gastrointestinal: BS x 4 abdomen soft, NT/ND    Extremities: No LE edema b/l.  radial and DP pulses 2+ b/l     Incision Sites: Right VATs incisions and chest tube sites clean no drainage, erythema.      LABS:                        14.0   20.1  )-----------( 392      ( 2017 11:44 )             41.7       COUMADIN:  No.     PT/INR - ( 2017 11:44 )   PT: 12.2 sec;   INR: 1.10          PTT - ( 2017 11:44 )  PTT:29.6 sec        137  |  96  |  18  ----------------------------<  92  4.2   |  26  |  1.20    Ca    9.9      2017 11:44  Phos  2.8     06-14  Mg     2.1     06-14        Urinalysis Basic - ( 2017 18:06 )    Color: Yellow / Appearance: Clear / S.010 / pH: x  Gluc: x / Ketone: 15 mg/dL  / Bili: NEGATIVE / Urobili: 0.2 E.U./dL   Blood: x / Protein: NEGATIVE mg/dL / Nitrite: NEGATIVE   Leuk Esterase: NEGATIVE / RBC: < 5 /HPF / WBC < 5 /HPF   Sq Epi: x / Non Sq Epi: Rare /HPF / Bacteria: Present /HPF        MEDICATIONS  (STANDING):  lactated ringers. 1000milliLiter(s) IV Continuous <Continuous>  heparin  Injectable 5000Unit(s) SubCutaneous every 8 hours  pantoprazole    Tablet 40milliGRAM(s) Oral before breakfast  docusate sodium 100milliGRAM(s) Oral three times a day  senna 2Tablet(s) Oral at bedtime  atorvastatin 40milliGRAM(s) Oral at bedtime  levothyroxine 200MICROGram(s) Oral daily    MEDICATIONS  (PRN):  acetaminophen   Tablet. 650milliGRAM(s) Oral every 6 hours PRN Mild Pain (1 - 3)  oxyCODONE IR 5milliGRAM(s) Oral every 6 hours PRN Moderate Pain (4 - 6)  oxyCODONE IR 10milliGRAM(s) Oral every 6 hours PRN Severe Pain (7 - 10)  polyethylene glycol 3350 17Gram(s) Oral daily PRN Constipation        RADIOLOGY & ADDITIONAL TESTS:    XRAY  FINDINGS: Portable view of the chest is compared to 2017 and   demonstrates normal heart size. Midline trachea. No consolidation. No   pleural effusion. Chest tube overlying the right paramediastinal region.   Minimal discoid atelectasis in the left lower lobe.

## 2017-06-15 LAB
ANION GAP SERPL CALC-SCNC: 12 MMOL/L — SIGNIFICANT CHANGE UP (ref 5–17)
BASOPHILS NFR BLD AUTO: 0.2 % — SIGNIFICANT CHANGE UP (ref 0–2)
BUN SERPL-MCNC: 19 MG/DL — SIGNIFICANT CHANGE UP (ref 7–23)
CALCIUM SERPL-MCNC: 9 MG/DL — SIGNIFICANT CHANGE UP (ref 8.4–10.5)
CHLORIDE SERPL-SCNC: 102 MMOL/L — SIGNIFICANT CHANGE UP (ref 96–108)
CO2 SERPL-SCNC: 25 MMOL/L — SIGNIFICANT CHANGE UP (ref 22–31)
CREAT SERPL-MCNC: 1.1 MG/DL — SIGNIFICANT CHANGE UP (ref 0.5–1.3)
EOSINOPHIL NFR BLD AUTO: 2.5 % — SIGNIFICANT CHANGE UP (ref 0–6)
GLUCOSE SERPL-MCNC: 74 MG/DL — SIGNIFICANT CHANGE UP (ref 70–99)
HCT VFR BLD CALC: 35.1 % — LOW (ref 39–50)
HGB BLD-MCNC: 11.5 G/DL — LOW (ref 13–17)
LYMPHOCYTES # BLD AUTO: 16.2 % — SIGNIFICANT CHANGE UP (ref 13–44)
MCHC RBC-ENTMCNC: 27.7 PG — SIGNIFICANT CHANGE UP (ref 27–34)
MCHC RBC-ENTMCNC: 32.8 G/DL — SIGNIFICANT CHANGE UP (ref 32–36)
MCV RBC AUTO: 84.6 FL — SIGNIFICANT CHANGE UP (ref 80–100)
MONOCYTES NFR BLD AUTO: 11.4 % — SIGNIFICANT CHANGE UP (ref 2–14)
NEUTROPHILS NFR BLD AUTO: 69.7 % — SIGNIFICANT CHANGE UP (ref 43–77)
PLATELET # BLD AUTO: 334 K/UL — SIGNIFICANT CHANGE UP (ref 150–400)
POTASSIUM SERPL-MCNC: 4.3 MMOL/L — SIGNIFICANT CHANGE UP (ref 3.5–5.3)
POTASSIUM SERPL-SCNC: 4.3 MMOL/L — SIGNIFICANT CHANGE UP (ref 3.5–5.3)
RBC # BLD: 4.15 M/UL — LOW (ref 4.2–5.8)
RBC # FLD: 14.4 % — SIGNIFICANT CHANGE UP (ref 10.3–16.9)
SODIUM SERPL-SCNC: 139 MMOL/L — SIGNIFICANT CHANGE UP (ref 135–145)
WBC # BLD: 10.2 K/UL — SIGNIFICANT CHANGE UP (ref 3.8–10.5)
WBC # FLD AUTO: 10.2 K/UL — SIGNIFICANT CHANGE UP (ref 3.8–10.5)

## 2017-06-15 PROCEDURE — 99233 SBSQ HOSP IP/OBS HIGH 50: CPT | Mod: GC

## 2017-06-15 PROCEDURE — 71010: CPT | Mod: 26

## 2017-06-15 RX ORDER — LACTULOSE 10 G/15ML
10 SOLUTION ORAL ONCE
Qty: 0 | Refills: 0 | Status: COMPLETED | OUTPATIENT
Start: 2017-06-15 | End: 2017-06-15

## 2017-06-15 RX ADMIN — HEPARIN SODIUM 5000 UNIT(S): 5000 INJECTION INTRAVENOUS; SUBCUTANEOUS at 13:46

## 2017-06-15 RX ADMIN — HEPARIN SODIUM 5000 UNIT(S): 5000 INJECTION INTRAVENOUS; SUBCUTANEOUS at 21:38

## 2017-06-15 RX ADMIN — Medication 100 MILLIGRAM(S): at 07:05

## 2017-06-15 RX ADMIN — Medication 100 MILLIGRAM(S): at 13:45

## 2017-06-15 RX ADMIN — Medication 200 MICROGRAM(S): at 07:05

## 2017-06-15 RX ADMIN — PANTOPRAZOLE SODIUM 40 MILLIGRAM(S): 20 TABLET, DELAYED RELEASE ORAL at 07:05

## 2017-06-15 RX ADMIN — LACTULOSE 10 GRAM(S): 10 SOLUTION ORAL at 13:02

## 2017-06-15 RX ADMIN — Medication 100 MILLIGRAM(S): at 21:38

## 2017-06-15 RX ADMIN — HEPARIN SODIUM 5000 UNIT(S): 5000 INJECTION INTRAVENOUS; SUBCUTANEOUS at 07:05

## 2017-06-15 RX ADMIN — SENNA PLUS 2 TABLET(S): 8.6 TABLET ORAL at 21:38

## 2017-06-15 NOTE — PROGRESS NOTE ADULT - SUBJECTIVE AND OBJECTIVE BOX
Interval Events: reviewed  Patient seen and examined at bedside.    Patient is a 66y old  Male he is experiencing pain at the CT site but doing better    PAST MEDICAL & SURGICAL HISTORY:  Hip pain, chronic, right  AVM (arteriovenous malformation): in the KIDNEY  Pleural effusion  HLD (hyperlipidemia)  HTN (hypertension)  History of thoracentesis: May 2017  History of hip replacement, total, right      MEDICATIONS:  Pulmonary:    Antimicrobials:    Anticoagulants:  heparin  Injectable 5000Unit(s) SubCutaneous every 8 hours    Cardiac:      Allergies    codeine (Rash)  sulfa drugs (Unknown)    Intolerances    NSAID (Nephrotoxicity)      Vital Signs Last 24 Hrs  T(C): 36.5, Max: 37.4 (06-14 @ 21:20)  T(F): 97.7, Max: 99.4 (06-14 @ 21:20)  HR: 69 (69 - 102)  BP: 108/59 (100/59 - 125/72)  BP(mean): 77 (76 - 79)  RR: 18 (16 - 18)  SpO2: 97% (97% - 98%)    I & Os for current day (as of 06-15 @ 12:47)  =============================================  IN: 400 ml / OUT: 2304 ml / NET: -1904 ml        LABS:  ABG - ( 13 Jun 2017 17:25 )  pH: 7.39  /  pCO2: 38    /  pO2: 152   / HCO3: 22    / Base Excess: -2.3  /  SaO2: 99                  CBC Full  -  ( 14 Jun 2017 11:44 )  WBC Count : 20.1 K/uL  Hemoglobin : 14.0 g/dL  Hematocrit : 41.7 %  Platelet Count - Automated : 392 K/uL  Mean Cell Volume : 84.6 fL  Mean Cell Hemoglobin : 28.4 pg  Mean Cell Hemoglobin Concentration : 33.6 g/dL  Auto Neutrophil # : x  Auto Lymphocyte # : x  Auto Monocyte # : x  Auto Eosinophil # : x  Auto Basophil # : x  Auto Neutrophil % : x  Auto Lymphocyte % : x  Auto Monocyte % : x  Auto Eosinophil % : x  Auto Basophil % : x    06-15    139  |  102  |  19  ----------------------------<  74  4.3   |  25  |  1.10    Ca    9.0      15 Simón 2017 11:02  Phos  2.8     06-14  Mg     2.1     06-14      PT/INR - ( 14 Jun 2017 11:44 )   PT: 12.2 sec;   INR: 1.10          PTT - ( 14 Jun 2017 11:44 )  PTT:29.6 sec            EXAM:  XR CHEST 1 VIEW PORT IMMEDIATE                          PROCEDURE DATE:  06/14/2017                     INTERPRETATION:  CLINICAL INDICATION: 66-year-old post thoracic surgery.      FINDINGS: Portable view of the chest is compared to 6/13/2017 and   demonstrates normal heart size. Midline trachea. No consolidation. No   pleural effusion. Chest tube overlying the right paramediastinal region.   Minimal discoid atelectasis in the left lower lobe.    NO change in the CXR from today       RADIOLOGY & ADDITIONAL STUDIES (The following images were personally reviewed):  Alberto:                               No  Urine output:               Yes          DVT prophylaxis:         Yes          Flattus:                          Yes          Bowel movement:      NO

## 2017-06-15 NOTE — PROGRESS NOTE ADULT - SUBJECTIVE AND OBJECTIVE BOX
Patient discussed on morning rounds with Dr. Jha    Operation / Date: R VATS RA Decortication on 6/13/17    SUBJECTIVE ASSESSMENT:  66y Male seen at bedside this AM.  Pt is doing well overall, endorses frustration with having chest tubes in place and wishes to go home.  Has not had a post-op BM yet but endorses flatulence and denies acute abdominal pain.  Denies HA, AMS, CP, palpitations, SOB, n/v/d, fever.  No acute overnight events.         Vital Signs Last 24 Hrs  T(C): 37, Max: 37.4 (06-14 @ 21:20)  T(F): 98.6, Max: 99.4 (06-14 @ 21:20)  HR: 74 (69 - 88)  BP: 109/77 (100/59 - 109/77)  BP(mean): 89 (76 - 89)  RR: 16 (16 - 18)  SpO2: 97% (97% - 98%)  I&O's Detail  I & Os for 24h ending 15 Simón 2017 07:00  =============================================  IN:    Oral Fluid: 400 ml    Total IN: 400 ml  ---------------------------------------------  OUT:    Voided: 2050 ml    Chest Tube: 144 ml    Chest Tube: 110 ml    Total OUT: 2304 ml  ---------------------------------------------  Total NET: -1904 ml    I & Os for current day (as of 15 Simón 2017 15:01)  =============================================  IN:    Oral Fluid: 550 ml    Total IN: 550 ml  ---------------------------------------------  OUT:    Voided: 550 ml    Total OUT: 550 ml  ---------------------------------------------  Total NET: 0 ml      CHEST TUBE:  Yes x 2; 1+ intermittent air leaks present on both on suction   TIE DOWNS: Yes.  CERDA: No.    PHYSICAL EXAM:    General: OOB to chair, no AMS or focal deficits.     Neurological: AAOx3, no AMS or focal deficits.     Cardiovascular: RRR, S1/S2, no m/r/g.     Respiratory: No acute distress. Chest tubes present on L lateral chest wall, no palpable crepitus on exam.  CTA b/l, no w/r/r.       Gastrointestinal: ND, NBS, non-TTP.    Extremities: Warm and well perfused, no calf ttp b/l.     Vascular: Pulses 2+ throughout.     Incision Sites: R VATS: C/D/I.     LABS:                        11.5   10.2  )-----------( 334      ( 15 Simón 2017 11:02 )             35.1       COUMADIN:  No.  PT/INR - ( 14 Jun 2017 11:44 )   PT: 12.2 sec;   INR: 1.10          PTT - ( 14 Jun 2017 11:44 )  PTT:29.6 sec    06-15    139  |  102  |  19  ----------------------------<  74  4.3   |  25  |  1.10    Ca    9.0      15 Simón 2017 11:02  Phos  2.8     06-14  Mg     2.1     06-14      MEDICATIONS  (STANDING):  lactated ringers. 1000milliLiter(s) IV Continuous <Continuous>  heparin  Injectable 5000Unit(s) SubCutaneous every 8 hours  pantoprazole    Tablet 40milliGRAM(s) Oral before breakfast  docusate sodium 100milliGRAM(s) Oral three times a day  senna 2Tablet(s) Oral at bedtime  levothyroxine 200MICROGram(s) Oral daily    MEDICATIONS  (PRN):  acetaminophen   Tablet. 650milliGRAM(s) Oral every 6 hours PRN Mild Pain (1 - 3)  oxyCODONE IR 5milliGRAM(s) Oral every 6 hours PRN Moderate Pain (4 - 6)  oxyCODONE IR 10milliGRAM(s) Oral every 6 hours PRN Severe Pain (7 - 10)  polyethylene glycol 3350 17Gram(s) Oral daily PRN Constipation        RADIOLOGY & ADDITIONAL TESTS:    EXAM:  XR CHEST 1 VIEW PORT IMMEDIATE                          PROCEDURE DATE:  06/14/2017           INTERPRETATION:  CLINICAL INDICATION: 66-year-old post thoracic surgery.      FINDINGS: Portable view of the chest is compared to 6/13/2017 and   demonstrates normal heart size. Midline trachea. No consolidation. No   pleural effusion. Chest tube overlying the right paramediastinal region.   Minimal discoid atelectasis in the left lower lobe.

## 2017-06-15 NOTE — PROGRESS NOTE ADULT - ASSESSMENT
66y M with history of HTN, HLD, mechanical fall in 2016, pleural effusion subsequently drained with thoracentesis in 2017 who was admitted to [facility] on 6/12/17 with recurrent L pleural effusion, s/p L pigtail placement on admission and now POD 2 s/p R VATS RA decortication with Dr. Jha on 6/13/17, uncomplicated intraop course. Extubated and transferred to PACU then to [facility] with 2 chest tubes in place and on suction with 4+ air leak on POD 1.  Now POD 2.       Neuro: Pain well managed.   -c/w PRNs for pain control    Respiratory: 97% on RA; POD 2 s/p R VATS Decortication  -2 chest tubes present, on suction with 1+ intermittent air leak.  To remain on suction overnight.   -Small L apical ptx noted on AM CXR, f/u AM CXR tomorrow.  -IS and ambulation  -Monitor SpO2 for respiratory status    CVS: HD Stable, HR controlled.  Hx of HTN, HLD.  -No acute issues at this time  -Monitor HR/BP/Tele    GI: Stable, tolerating PO diet   -PPX: Protonix  -C/w bowel regimen, colace and senna    /Renal: Cr: 1.20; No urinary issues  -Trend AM Cr  -Monitor I/O    ID: Afebrile, Asymptomatic  -No acute issues at this time, continue to monitor for SIRS    Endo: TSH: 3.633, hx of hypothyroidism  -C/w home dose Synthroid- 200mcg PO QD      Heme: H/H Stable  -DVT PPX: HSQ 5000 q8h and SCDs  -WCC elevated at 20 this AM, repeat CBC w/ Diff in AM, WCC 10.  No acute issues at this time  -CBC, chem in AM    Dispo: Home pending chest tube management.

## 2017-06-16 LAB
ANION GAP SERPL CALC-SCNC: 14 MMOL/L — SIGNIFICANT CHANGE UP (ref 5–17)
BUN SERPL-MCNC: 17 MG/DL — SIGNIFICANT CHANGE UP (ref 7–23)
CALCIUM SERPL-MCNC: 8.9 MG/DL — SIGNIFICANT CHANGE UP (ref 8.4–10.5)
CHLORIDE SERPL-SCNC: 102 MMOL/L — SIGNIFICANT CHANGE UP (ref 96–108)
CO2 SERPL-SCNC: 23 MMOL/L — SIGNIFICANT CHANGE UP (ref 22–31)
CREAT SERPL-MCNC: 1.1 MG/DL — SIGNIFICANT CHANGE UP (ref 0.5–1.3)
GLUCOSE SERPL-MCNC: 96 MG/DL — SIGNIFICANT CHANGE UP (ref 70–99)
HCT VFR BLD CALC: 36 % — LOW (ref 39–50)
HGB BLD-MCNC: 11.5 G/DL — LOW (ref 13–17)
MAGNESIUM SERPL-MCNC: 2.2 MG/DL — SIGNIFICANT CHANGE UP (ref 1.6–2.6)
MCHC RBC-ENTMCNC: 27.2 PG — SIGNIFICANT CHANGE UP (ref 27–34)
MCHC RBC-ENTMCNC: 31.9 G/DL — LOW (ref 32–36)
MCV RBC AUTO: 85.1 FL — SIGNIFICANT CHANGE UP (ref 80–100)
PLATELET # BLD AUTO: 351 K/UL — SIGNIFICANT CHANGE UP (ref 150–400)
POTASSIUM SERPL-MCNC: 4 MMOL/L — SIGNIFICANT CHANGE UP (ref 3.5–5.3)
POTASSIUM SERPL-SCNC: 4 MMOL/L — SIGNIFICANT CHANGE UP (ref 3.5–5.3)
RBC # BLD: 4.23 M/UL — SIGNIFICANT CHANGE UP (ref 4.2–5.8)
RBC # FLD: 14.1 % — SIGNIFICANT CHANGE UP (ref 10.3–16.9)
SODIUM SERPL-SCNC: 139 MMOL/L — SIGNIFICANT CHANGE UP (ref 135–145)
SURGICAL PATHOLOGY STUDY: SIGNIFICANT CHANGE UP
WBC # BLD: 7.8 K/UL — SIGNIFICANT CHANGE UP (ref 3.8–10.5)
WBC # FLD AUTO: 7.8 K/UL — SIGNIFICANT CHANGE UP (ref 3.8–10.5)

## 2017-06-16 PROCEDURE — 99233 SBSQ HOSP IP/OBS HIGH 50: CPT | Mod: GC

## 2017-06-16 PROCEDURE — 71010: CPT | Mod: 26

## 2017-06-16 PROCEDURE — 71010: CPT | Mod: 26,77

## 2017-06-16 RX ORDER — LACTULOSE 10 G/15ML
10 SOLUTION ORAL ONCE
Qty: 0 | Refills: 0 | Status: COMPLETED | OUTPATIENT
Start: 2017-06-16 | End: 2017-06-16

## 2017-06-16 RX ADMIN — Medication 200 MICROGRAM(S): at 06:26

## 2017-06-16 RX ADMIN — Medication 650 MILLIGRAM(S): at 03:00

## 2017-06-16 RX ADMIN — Medication 100 MILLIGRAM(S): at 21:26

## 2017-06-16 RX ADMIN — Medication 100 MILLIGRAM(S): at 14:34

## 2017-06-16 RX ADMIN — PANTOPRAZOLE SODIUM 40 MILLIGRAM(S): 20 TABLET, DELAYED RELEASE ORAL at 06:26

## 2017-06-16 RX ADMIN — HEPARIN SODIUM 5000 UNIT(S): 5000 INJECTION INTRAVENOUS; SUBCUTANEOUS at 21:26

## 2017-06-16 RX ADMIN — HEPARIN SODIUM 5000 UNIT(S): 5000 INJECTION INTRAVENOUS; SUBCUTANEOUS at 06:26

## 2017-06-16 RX ADMIN — SENNA PLUS 2 TABLET(S): 8.6 TABLET ORAL at 21:26

## 2017-06-16 RX ADMIN — Medication 650 MILLIGRAM(S): at 02:04

## 2017-06-16 RX ADMIN — HEPARIN SODIUM 5000 UNIT(S): 5000 INJECTION INTRAVENOUS; SUBCUTANEOUS at 14:38

## 2017-06-16 RX ADMIN — LACTULOSE 10 GRAM(S): 10 SOLUTION ORAL at 09:34

## 2017-06-16 RX ADMIN — Medication 100 MILLIGRAM(S): at 06:26

## 2017-06-16 NOTE — PROGRESS NOTE ADULT - SUBJECTIVE AND OBJECTIVE BOX
Interval Events:reviewed  Patient seen and examined at bedside.    Patient is a 66y old  Male who presents with a chief complaint of Known right pleural effusion (12 Jun 2017 13:43)  Is doing well one of the tubes is out. Is able to do the taking deep breath. 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  Pleural effusion  HLD (hyperlipidemia)  HTN (hypertension)  History of thoracentesis: May 2017  History of hip replacement, total, right      MEDICATIONS:  Pulmonary:    Antimicrobials:    Anticoagulants:  heparin  Injectable 5000Unit(s) SubCutaneous every 8 hours    Cardiac:      Allergies    codeine (Rash)  sulfa drugs (Unknown)    Intolerances    NSAID (Nephrotoxicity)      Vital Signs Last 24 Hrs  T(C): 37, Max: 37.1 (06-16 @ 01:53)  T(F): 98.6, Max: 98.8 (06-16 @ 01:53)  HR: 69 (64 - 80)  BP: 113/63 (109/73 - 122/74)  BP(mean): 82 (82 - 92)  RR: 18 (16 - 18)  SpO2: 97% (95% - 98%)  I & Os for 24h ending 06-16 @ 07:00  =============================================  IN: 1650 ml / OUT: 3171 ml / NET: -1521 ml    I & Os for current day (as of 06-16 @ 21:16)  =============================================  IN: 0 ml / OUT: 840 ml / NET: -840 ml        LABS:      CBC Full  -  ( 16 Jun 2017 06:08 )  WBC Count : 7.8 K/uL  Hemoglobin : 11.5 g/dL  Hematocrit : 36.0 %  Platelet Count - Automated : 351 K/uL  Mean Cell Volume : 85.1 fL  Mean Cell Hemoglobin : 27.2 pg  Mean Cell Hemoglobin Concentration : 31.9 g/dL  Auto Neutrophil # : x  Auto Lymphocyte # : x  Auto Monocyte # : x  Auto Eosinophil # : x  Auto Basophil # : x  Auto Neutrophil % : x  Auto Lymphocyte % : x  Auto Monocyte % : x  Auto Eosinophil % : x  Auto Basophil % : x    06-16    139  |  102  |  17  ----------------------------<  96  4.0   |  23  |  1.10    Ca    8.9      16 Jun 2017 06:08  Mg     2.2     06-16                    EXAM:  XR CHEST 1 VIEW PORT IMMEDIATE                          PROCEDURE DATE:  06/16/2017                     INTERPRETATION:    PORTABLE CHEST X-RAY    Indication: chest tube pull, r/o PTX    Bedside examination of the chest dated 6/16/2017 10:58AM is compared to   the previous study of 6/16/2017.    Findings: One right-sided chest tube removed. One right chest tube again   present. No change small right-sided pneumothorax. Small subcutaneous   emphysema right lateral chest wall. No new finding.    Impression: No change small right-sided pneumothorax.    Cytopathology - Non Gyn Report (06.12.17 @ 12:56)    Cytopathology - Non Gyn Report:   ACCESSION No:  [id number]    FELIPE WASHINGTON        Cytopathology Report            Specimen(s) Submitted  PLEURAL FLUID,RIGHT      Clinical History  Patient history of pleural effusiion      Gross Description  Received: 80  ml of bloody fluid received in [facility]  Prepared: 1 Thin Prep slide,1 Cell Block      Statement of Adequacy  Satisfactory for interpretation.      Final Diagnosis  PLEURAL FLUID, RIGHT:    NEGATIVE FOR MALIGNANT CELLS.    Mesothelial cells, histiocytes, abundant lymphocytes and some  neutrophils  Cell block reviewed    Avril Ash, CT(ASCP)  XCoretta Aaron MD, PhD  (Electronic Signature)  Reported on: 06/14/17  ________________________________________________________________    Surgical Pathology Report (06.13.17 @ 13:21)    Surgical Pathology Report:   ACCESSION No:  [id number]    FELIPE WASHINGTON        Surgical Final Report          Final Diagnosis    Right pleural peel:  - Thickened fibrotic pleura with reactive changes, chronic  inflammation and organized fibrin.    Krista Moise M.D.  (Electronic Signature)  Reported on: 06/16/17    Clinical History  Right pleural effusion    Specimen(s) Submitted  1. Pleural peel right    Gross Description  The specimen is received fresh, labeled with the patient's  identification and "right pleural peel."  It consists of an  irregular piece of smooth to ragged, pink-tan fibromembranous  tissue measuring 13.5 x 10 x 0.4 cm in aggregate.  Sectioning  reveals a white-pink to red-tan, heterogeneous cut surface.  The  tissue ranges from less than 0.1 to 0.5 cm in thickness.  Representative sections are submitted in three cassettes, 1A-1C.   06/14/17 15:16        RADIOLOGY & ADDITIONAL STUDIES (The following images were personally reviewed):  Alberto:                                No  Urine output:               Yes       DVT prophylaxis:         Yes         Flattus:                          Yes         Bowel movement:       Yes

## 2017-06-16 NOTE — PROGRESS NOTE ADULT - ASSESSMENT
66y M with history of HTN, HLD, mechanical fall in 2016, pleural effusion subsequently drained with thoracentesis in 2017 who was admitted to [facility] on 6/12/17 with recurrent L pleural effusion, s/p L pigtail placement on admission and now POD 2 s/p R VATS RA decortication with Dr. Jha on 6/13/17, uncomplicated intraop course. Extubated and transferred to PACU then to [facility] with 2 chest tubes in place and on suction with 4+ air leak on POD 1.  Remained HD stable with CT on suction on POD 2.  Now POD 3.        Neuro: Pain well managed.   -c/w PRNs for pain control    Respiratory: 97% on RA; POD 2 s/p R VATS Decortication  -2 chest tubes present, on suction with 1+ intermittent air leak on posterior tube.   -Per Dr. Jha, Anterior chest tube removed at bedside, f/u CXR ordered, f/u results.  Posterior chest tube to remain on suction.   -IS and ambulation  -Monitor SpO2 for respiratory status    CVS: HD Stable, HR controlled.  Hx of HTN, HLD.  -No acute issues at this time  -Monitor HR/BP/Tele    GI: Stable, tolerating PO diet   -PPX: Protonix  -C/w bowel regimen, colace and senna    /Renal: Cr: 1.10; No urinary issues  -Trend AM Cr  -Monitor I/O    ID: Afebrile, Asymptomatic  -No acute issues at this time, continue to monitor for SIRS    Endo: TSH: 3.633, hx of hypothyroidism  -C/w home dose Synthroid- 200mcg PO QD      Heme: H/H Stable  -DVT PPX: HSQ 5000 q8h and SCDs  -CBC, chem in AM    Dispo: Home pending chest tube management.

## 2017-06-16 NOTE — PROGRESS NOTE ADULT - PROBLEM SELECTOR PLAN 4
for blood pressure control and the current regimen

## 2017-06-16 NOTE — PROGRESS NOTE ADULT - PROBLEM SELECTOR PLAN 5
I discussed the case with thoracic surgery and there was no visible AVM on the pleural surface

## 2017-06-16 NOTE — PROGRESS NOTE ADULT - PROBLEM SELECTOR PLAN 3
continue on the statin and liver function test was normal

## 2017-06-16 NOTE — CHART NOTE - NSCHARTNOTEFT_GEN_A_CORE
Anterior chest tube evaluated at bedside, minimal overnight drainage, no obvious air leak.  Per Dr. Jha, anterior chest tube removed at bedside and secured with tie down suture with occlusive dressing.  Posterior chest tube remained in place and on suction.  Follow up chest xray ordered... Pt tolerated the procedure well. Anterior chest tube evaluated at bedside, minimal overnight drainage, no obvious air leak.  Per Dr. Jha, anterior chest tube removed at bedside and secured with tie down suture with occlusive dressing.  Posterior chest tube remained in place and on suction.  Follow up chest xray ordered, no obvious ptx.  Pt tolerated the procedure well.

## 2017-06-16 NOTE — PROGRESS NOTE ADULT - SUBJECTIVE AND OBJECTIVE BOX
Patient discussed on morning rounds with Dr. Jha    Operation / Date: R VATS RA Decortication on 6/13/17    SUBJECTIVE ASSESSMENT:  66y Male seen at bedside this AM.  Continues to feel well post-operatively and offers no acute complaints at this time. He has not yet had a BM but endorses flatulence.  Denies HA, AMS, CP, palpitations, SOB, n/v/d, fever.         Vital Signs Last 24 Hrs  T(C): 36.6, Max: 37.1 (06-16 @ 01:53)  T(F): 97.9, Max: 98.8 (06-16 @ 01:53)  HR: 80 (64 - 80)  BP: 115/70 (109/73 - 120/77)  BP(mean): 87 (84 - 93)  RR: 17 (15 - 17)  SpO2: 98% (95% - 98%)  I&O's Detail  I & Os for 24h ending 16 Jun 2017 07:00  =============================================  IN:    Oral Fluid: 1100 ml    Oral Fluid: 550 ml    Total IN: 1650 ml  ---------------------------------------------  OUT:    Voided: 2425 ml    Voided: 550 ml    Chest Tube: 176 ml    Chest Tube: 20 ml    Total OUT: 3171 ml  ---------------------------------------------  Total NET: -1521 ml    I & Os for current day (as of 16 Jun 2017 10:54)  =============================================  IN:    Total IN: 0 ml  ---------------------------------------------  OUT:    Voided: 400 ml    Total OUT: 400 ml  ---------------------------------------------  Total NET: -400 ml      CHEST TUBE:  Yes x 2; 1+ intermittent air leaks present on posterior chest tube, no air leak on anterior chest tube.  TIE DOWNS: Yes.  CERDA: No.    PHYSICAL EXAM:    General: OOB to chair, no AMS or focal deficits.     Neurological: AAOx3, no AMS or focal deficits.     Cardiovascular: RRR, S1/S2, no m/r/g.     Respiratory: No acute distress. Chest tubes present on L lateral chest wall, no palpable crepitus on exam.  CTA b/l, no w/r/r.       Gastrointestinal: ND, NBS, non-TTP.    Extremities: Warm and well perfused, no calf ttp b/l.     Vascular: Pulses 2+ throughout.     Incision Sites: R VATS: C/D/I.       LABS:                        11.5   7.8   )-----------( 351      ( 16 Jun 2017 06:08 )             36.0       COUMADIN:  No.   PT/INR - ( 14 Jun 2017 11:44 )   PT: 12.2 sec;   INR: 1.10          PTT - ( 14 Jun 2017 11:44 )  PTT:29.6 sec    06-16    139  |  102  |  17  ----------------------------<  96  4.0   |  23  |  1.10    Ca    8.9      16 Jun 2017 06:08  Phos  2.8     06-14  Mg     2.2     06-16      MEDICATIONS  (STANDING):  heparin  Injectable 5000Unit(s) SubCutaneous every 8 hours  pantoprazole    Tablet 40milliGRAM(s) Oral before breakfast  docusate sodium 100milliGRAM(s) Oral three times a day  senna 2Tablet(s) Oral at bedtime  levothyroxine 200MICROGram(s) Oral daily    MEDICATIONS  (PRN):  acetaminophen   Tablet. 650milliGRAM(s) Oral every 6 hours PRN Mild Pain (1 - 3)  oxyCODONE IR 5milliGRAM(s) Oral every 6 hours PRN Moderate Pain (4 - 6)  oxyCODONE IR 10milliGRAM(s) Oral every 6 hours PRN Severe Pain (7 - 10)  polyethylene glycol 3350 17Gram(s) Oral daily PRN Constipation        RADIOLOGY & ADDITIONAL TESTS:    EXAM:  XR CHEST 1 VIEW PORT IMMEDIATE                          PROCEDURE DATE:  06/14/2017           INTERPRETATION:  CLINICAL INDICATION: 66-year-old post thoracic surgery.      FINDINGS: Portable view of the chest is compared to 6/13/2017 and   demonstrates normal heart size. Midline trachea. No consolidation. No   pleural effusion. Chest tube overlying the right paramediastinal region.   Minimal discoid atelectasis in the left lower lobe.

## 2017-06-16 NOTE — PROGRESS NOTE ADULT - RS GEN PE MLT RESP DETAILS PC
diminished breath sounds, R
diminished breath sounds, L/diminished breath sounds, R
diminished breath sounds, R/diminished breath sounds, L

## 2017-06-16 NOTE — PROGRESS NOTE ADULT - PROBLEM SELECTOR PLAN 6
patient is clinically stable postoperatively. He is on DVT prophylaxis. Advance diet. Pain is controlled

## 2017-06-16 NOTE — DIETITIAN INITIAL EVALUATION ADULT. - OTHER INFO
67y/o M admitted with pleural effusion s/p R VATS RA decortication. Pt seen resting OOB in chair. He reports a good appetite and intake. Consuming ~75% of meals. Denies N/V/D, pain, and mechanical issues. C/o no BM since surgery (x3 days) and reports feeling bloated/constipated. Continue bowel regimen and encouraged adequate fluid/fiber intake. Pt endorses eating a lot of fiber; RD called kitchen and ordered pt prunes + prune juice. Pt denies any recent wt/diet changes. Will follow.

## 2017-06-17 PROCEDURE — 71010: CPT | Mod: 26,76

## 2017-06-17 RX ADMIN — Medication 100 MILLIGRAM(S): at 14:26

## 2017-06-17 RX ADMIN — Medication 650 MILLIGRAM(S): at 01:15

## 2017-06-17 RX ADMIN — Medication 100 MILLIGRAM(S): at 05:29

## 2017-06-17 RX ADMIN — Medication 200 MICROGRAM(S): at 05:28

## 2017-06-17 RX ADMIN — PANTOPRAZOLE SODIUM 40 MILLIGRAM(S): 20 TABLET, DELAYED RELEASE ORAL at 06:12

## 2017-06-17 RX ADMIN — Medication 650 MILLIGRAM(S): at 06:15

## 2017-06-17 RX ADMIN — Medication 650 MILLIGRAM(S): at 05:29

## 2017-06-17 RX ADMIN — SENNA PLUS 2 TABLET(S): 8.6 TABLET ORAL at 21:36

## 2017-06-17 RX ADMIN — OXYCODONE HYDROCHLORIDE 5 MILLIGRAM(S): 5 TABLET ORAL at 03:02

## 2017-06-17 RX ADMIN — Medication 100 MILLIGRAM(S): at 21:36

## 2017-06-17 RX ADMIN — Medication 650 MILLIGRAM(S): at 00:23

## 2017-06-17 RX ADMIN — OXYCODONE HYDROCHLORIDE 5 MILLIGRAM(S): 5 TABLET ORAL at 04:00

## 2017-06-17 NOTE — PROGRESS NOTE ADULT - ASSESSMENT
66y M with history of HTN, HLD, mechanical fall in 2016, pleural effusion subsequently drained with thoracentesis in 2017 who was admitted to [facility] on 6/12/17 with recurrent L pleural effusion, s/p L pigtail placement on admission and now POD 2 s/p R VATS RA decortication with Dr. Jha on 6/13/17, uncomplicated intraop course. Extubated and transferred to PACU then to [facility] with 2 chest tubes in place and on suction with 4+ air leak on POD 0.  Remained HD stable with CT on suction on POD 1.  POD 2, anterior chest tube removed, posterior chest tube remained on suction.  HD stable.  Now POD 3.        Neuro: Pain well managed.   -c/w PRNs for pain control    Respiratory: 96% on RA; POD 3 s/p R VATS Decortication  -Posterior chest tube present, on suction with 1+ intermittent air leak.  Per Dr. Poe, placed on water seal in AM.  F/u CXR stable.  Will remain on water seal overnight, f/u AM CXR.   -IS and ambulation  -Monitor SpO2 for respiratory status    CVS: HD Stable, HR controlled.  Hx of HTN, HLD.  -No acute issues at this time  -Monitor HR/BP/Tele    GI: Stable, tolerating PO diet   -PPX: Protonix  -C/w bowel regimen, colace and senna    /Renal: Cr: 1.10; No urinary issues  -Trend AM Cr  -Monitor I/O    ID: Afebrile, Asymptomatic  -No acute issues at this time, continue to monitor for SIRS    Endo: TSH: 3.633, hx of hypothyroidism  -C/w home dose Synthroid- 200mcg PO QD      Heme: H/H Stable  -DVT PPX: HSQ 5000 q8h and SCDs  -CBC, chem in AM    Dispo: Home pending chest tube management.

## 2017-06-17 NOTE — PROGRESS NOTE ADULT - SUBJECTIVE AND OBJECTIVE BOX
Patient discussed on morning rounds with Dr. Poe / Dr. Jha     Operation / Date: R VATS RA Decortication on 6/13/17    SUBJECTIVE ASSESSMENT:  66y Male seen at bedside this AM.  Pt continues to do very well post-operatively with no acute complaints offered at this time.  He does continue to state that he feels frustrated with still being in the hospital but understands the need for it.  He has had a post-op BM with flatulence and no abdominal pain.  Denies HA, AMS, CP, palpitations, SOB, n/v/d, fever.         Vital Signs Last 24 Hrs  T(C): 36.8, Max: 37 (06-16 @ 20:50)  T(F): 98.2, Max: 98.6 (06-16 @ 20:50)  HR: 76 (66 - 76)  BP: 116/69 (109/67 - 122/81)  BP(mean): 88 (82 - 88)  RR: 16 (14 - 18)  SpO2: 98% (96% - 98%)  I&O's Detail    I & Os for current day (as of 17 Jun 2017 13:52)  =============================================  IN:    Total IN: 0 ml  ---------------------------------------------  OUT:    Voided: 1300 ml    Chest Tube: 40 ml    Total OUT: 1340 ml  ---------------------------------------------  Total NET: -1340 ml      CHEST TUBE:  Yes- posterior chest tube, 1+ air leak intermittently on suction  TIE DOWNS: Yes.  CERDA: No.    PHYSICAL EXAM:    General: OOB to chair, no AMS or focal deficits.     Neurological: AAOx3, no AMS or focal deficits.     Cardiovascular: RRR, S1/S2, no m/r/g.     Respiratory: No acute distress. Chest tubes present on L lateral chest wall, no palpable crepitus on exam.  CTA b/l, no w/r/r.       Gastrointestinal: ND, NBS, non-TTP.    Extremities: Warm and well perfused, no calf ttp b/l.     Vascular: Pulses 2+ throughout.     Incision Sites: R VATS: C/D/I.       LABS:                        11.5   7.8   )-----------( 351      ( 16 Jun 2017 06:08 )             36.0       COUMADIN:  No.     06-16    139  |  102  |  17  ----------------------------<  96  4.0   |  23  |  1.10    Ca    8.9      16 Jun 2017 06:08  Mg     2.2     06-16      MEDICATIONS  (STANDING):  heparin  Injectable 5000Unit(s) SubCutaneous every 8 hours  pantoprazole    Tablet 40milliGRAM(s) Oral before breakfast  docusate sodium 100milliGRAM(s) Oral three times a day  senna 2Tablet(s) Oral at bedtime  levothyroxine 200MICROGram(s) Oral daily    MEDICATIONS  (PRN):  acetaminophen   Tablet. 650milliGRAM(s) Oral every 6 hours PRN Mild Pain (1 - 3)  oxyCODONE IR 5milliGRAM(s) Oral every 6 hours PRN Moderate Pain (4 - 6)  oxyCODONE IR 10milliGRAM(s) Oral every 6 hours PRN Severe Pain (7 - 10)  polyethylene glycol 3350 17Gram(s) Oral daily PRN Constipation        RADIOLOGY & ADDITIONAL TESTS:    EXAM:  XR CHEST 1 VIEW PORT ROUTINE                          PROCEDURE DATE:  06/17/2017           INTERPRETATION:  Clinical History: Shortness of breath    Portable examination the chest demonstrates a right chest tube. Right   pneumothorax unchanged in comparison to prior examination of the chest   6/16/2017. Left costophrenic angles are not included on the film.    Impression: Status post placement right chest tube. No interval change   right pneumothorax

## 2017-06-18 VITALS
RESPIRATION RATE: 18 BRPM | DIASTOLIC BLOOD PRESSURE: 68 MMHG | SYSTOLIC BLOOD PRESSURE: 110 MMHG | HEART RATE: 96 BPM | OXYGEN SATURATION: 98 %

## 2017-06-18 PROCEDURE — 71010: CPT | Mod: 26

## 2017-06-18 RX ORDER — OXYCODONE HYDROCHLORIDE 5 MG/1
2 TABLET ORAL
Qty: 56 | Refills: 0 | OUTPATIENT
Start: 2017-06-18 | End: 2017-06-25

## 2017-06-18 RX ORDER — SENNA PLUS 8.6 MG/1
2 TABLET ORAL
Qty: 14 | Refills: 0 | OUTPATIENT
Start: 2017-06-18 | End: 2017-06-25

## 2017-06-18 RX ORDER — ACETAMINOPHEN 500 MG
2 TABLET ORAL
Qty: 240 | Refills: 0 | OUTPATIENT
Start: 2017-06-18 | End: 2017-07-18

## 2017-06-18 RX ORDER — DOCUSATE SODIUM 100 MG
1 CAPSULE ORAL
Qty: 21 | Refills: 0 | OUTPATIENT
Start: 2017-06-18 | End: 2017-06-25

## 2017-06-18 RX ORDER — POLYETHYLENE GLYCOL 3350 17 G/17G
17 POWDER, FOR SOLUTION ORAL
Qty: 119 | Refills: 0 | OUTPATIENT
Start: 2017-06-18 | End: 2017-06-25

## 2017-06-18 RX ADMIN — Medication 200 MICROGRAM(S): at 05:54

## 2017-06-18 RX ADMIN — OXYCODONE HYDROCHLORIDE 5 MILLIGRAM(S): 5 TABLET ORAL at 04:00

## 2017-06-18 RX ADMIN — PANTOPRAZOLE SODIUM 40 MILLIGRAM(S): 20 TABLET, DELAYED RELEASE ORAL at 06:36

## 2017-06-18 RX ADMIN — Medication 650 MILLIGRAM(S): at 00:16

## 2017-06-18 RX ADMIN — Medication 100 MILLIGRAM(S): at 05:54

## 2017-06-18 RX ADMIN — OXYCODONE HYDROCHLORIDE 5 MILLIGRAM(S): 5 TABLET ORAL at 03:10

## 2017-06-18 RX ADMIN — Medication 650 MILLIGRAM(S): at 01:00

## 2017-06-18 NOTE — DISCHARGE NOTE ADULT - CARE PLAN
Principal Discharge DX:	Pleural effusion  Goal:	s/p R VATS decortication  Instructions for follow-up, activity and diet:	-Please follow up with  ___on ___.  The office is located at [facility], [facility], 4th floor. Call us with any questions #[phone].    -Walk daily as tolerated and use your incentive spirometer every hour.    -No driving or strenuous activity/exercise for 6 weeks, or until cleared by your surgeon.    -Gently clean your incisions with anti-bacterial soap and water, pat dry.  You may leave them open to air.    -Call your doctor if you have shortness of breath, chest pain not relieved by pain medication, dizziness, fever >101.5, or increased redness or drainage from incisions. Principal Discharge DX:	Pleural effusion  Goal:	s/p R VATS decortication  Instructions for follow-up, activity and diet:	-Please follow up with Dr. Jha on Thursday 6/22/17 at 12:45PM.  The office is located at [facility], [facility], 4th floor. Call us with any questions #[phone].  - Please keep chest tube bandage clean, dry and intact until your appointment with Dr. Jha. You may sponge bathe but please do not get dressing wet. You have two stitches that will be removed by Dr. Jha at your follow up appointment.   -Walk daily as tolerated and use your incentive spirometer every hour.    -No driving or strenuous activity/exercise for 6 weeks, or until cleared by your surgeon.    -Gently clean your other incisions with anti-bacterial soap and water, pat dry.  You may leave them open to air.    -Call your doctor if you have shortness of breath, chest pain not relieved by pain medication, dizziness, fever >101.5, or increased redness or drainage from incisions.

## 2017-06-18 NOTE — PROGRESS NOTE ADULT - SUBJECTIVE AND OBJECTIVE BOX
Patient discussed on morning rounds with Dr. Poe     Operation / Date: 6/13/17 Right VATS RA Decortication     Surgeon: Dr. Jha     Referring Physician: Dr. Perdomo     SUBJECTIVE ASSESSMENT:  Patient seen this morning at bedside, doing well and not offering any complaints at this time. States he feels a huge relief following the removal of his chest tube this morning. Denies any chest pain, shortness of breath or palpitations.     Hospital Course:  66 year old male PMHx HTN, HLD, Hypothyroidism and right kidney AVM post IR embolization and hx of mechanical fall 10/2016 with 2 rib fractures an pleural effusion which was originally watched by outpatient provider and did not go away so patient underwent thoracentesis on 5/25/17 at [facility] which drained 1330cc. He was discharged home and found to have recurrent pleural effusion at outpatient appointment. He presented to [facility] ED 6/12/17 complaining of shortness of breath and was found to have pleural effusion. Pigtail catheter was placed by IR and patient underwent Right VATS Robotic Assisted decortication on 6/13/17 by Dr. Jha. Procedure was uncomplicated and patient was transferred to  post procedure. Post operative course complicated by persistent air leak that slowly resolved, Anterior chest tube was removed POD#3 and posterior chest tube as removed POD#5 without any complications. Post chest tube removal CXR on POD#5 showed slight increase in air space. Patient remained hemodynamically stable and asymptomatic. As per Dr. Poe patient is stable and ready for discharge home on POD#5.     Vital Signs Last 24 Hrs  T(C): 36.2, Max: 37.3 (06-17 @ 21:15)  T(F): 97.1, Max: 99.1 (06-17 @ 21:15)  HR: 96 (66 - 96)  BP: 110/68 (106/58 - 121/80)  BP(mean): 94 (75 - 94)  RR: 18 (14 - 18)  SpO2: 98% (97% - 98%)  I&O's Detail    I & Os for current day (as of 18 Jun 2017 12:54)  =============================================  IN:    Total IN: 0 ml  ---------------------------------------------  OUT:    Voided: 900 ml    Chest Tube: 50 ml    Total OUT: 950 ml  ---------------------------------------------  Total NET: -950 ml      EPICARDIAL WIRES REMOVED: Yes  TIE DOWNS REMOVED: No to be removed as an outpatient     PHYSICAL EXAM:    General: Patient lying comfortably in bed, no acute distress     Neurological: A&Ox3. No focal neurological deficits     Cardiovascular: S1S2, RRR, no murmurs appreciated on exam     Respiratory: Clear to ausculation bilaterally     Gastrointestinal: Abdomen soft, non tender, non distended     Extremities: Warm and well perfused. No edema or calf tenderness     Vascular: Peripheral pulses 2+ bilaterally     Incision Sites: Right VATS incisions C/D/I, no drainage or surrounding erythema   Chest tube incisions x 2 covered with occlusive dressing with tie downs in place.     LABS:      COUMADIN:  No     MEDICATIONS  (STANDING): See Med Rec       Discharge CXR: Mild progression right basilar pneumothorax. No acute infiltrates      - Please follow up with Dr. Jha on Thursday 6/22/17 at 12:45PM.  The office is located at [facility], [facility], 4th floor. Call us with any questions #[phone].  - Please keep chest tube bandage clean, dry and intact until your appointment with Dr. Jha. You may sponge bathe but please do not get dressing wet. You have two stitches that will be removed by Dr. Jha at your follow up appointment.

## 2017-06-18 NOTE — DISCHARGE NOTE ADULT - CARE PROVIDER_API CALL
Tima Jha (MD), Surgery; Thoracic Surgery  [address] [place] [address]  Phone: [phone]  Fax: [phone]

## 2017-06-18 NOTE — CHART NOTE - NSCHARTNOTEFT_GEN_A_CORE
As per Dr. Poe right pleural chest tube removed at bedside without any complications. Patient tolerated procedure well, U stitch tied down in place and occlusive dressing applied. Post pull CXR demonstrates small increase in basilar air space. Patient asymptomatic and hemodynamically stable. As per Dr. Poe stable for discharge home.

## 2017-06-18 NOTE — DISCHARGE NOTE ADULT - PLAN OF CARE
s/p R VATS decortication -Please follow up with  ___on ___.  The office is located at [facility], [facility], 4th floor. Call us with any questions #[phone].    -Walk daily as tolerated and use your incentive spirometer every hour.    -No driving or strenuous activity/exercise for 6 weeks, or until cleared by your surgeon.    -Gently clean your incisions with anti-bacterial soap and water, pat dry.  You may leave them open to air.    -Call your doctor if you have shortness of breath, chest pain not relieved by pain medication, dizziness, fever >101.5, or increased redness or drainage from incisions. -Please follow up with Dr. Jha on Thursday 6/22/17 at 12:45PM.  The office is located at [facility], [facility], 4th floor. Call us with any questions #[phone].  - Please keep chest tube bandage clean, dry and intact until your appointment with Dr. Jha. You may sponge bathe but please do not get dressing wet. You have two stitches that will be removed by Dr. Jha at your follow up appointment.   -Walk daily as tolerated and use your incentive spirometer every hour.    -No driving or strenuous activity/exercise for 6 weeks, or until cleared by your surgeon.    -Gently clean your other incisions with anti-bacterial soap and water, pat dry.  You may leave them open to air.    -Call your doctor if you have shortness of breath, chest pain not relieved by pain medication, dizziness, fever >101.5, or increased redness or drainage from incisions.

## 2017-06-18 NOTE — DISCHARGE NOTE ADULT - PATIENT PORTAL LINK FT
“You can access the [facility] Patient Portal, offered by [facility], by registering with the following website: [email or web address]”

## 2017-06-18 NOTE — DISCHARGE NOTE ADULT - HOSPITAL COURSE
66 year old male PMHx HTN, HLD, Hypothyroidism and right kidney AVM post IR embolization and hx of mechanical fall 10/2016 with 2 rib fractures an pleural effusion which was originally watched by outpatient provider and did not go away so patient underwent thoracentesis on 5/25/17 at [facility] which drained 1330cc. He was discharged home and found to have recurrent pleural effusion at outpatient appointment. He presented to [facility] ED 6/12/17 complaining of shortness of breath and was found to have pleural effusion. Pigtail catheter was placed by IR and patient underwent Right VATS Robotic Assisted decortication on 6/13/17 by Dr. Jha. Procedure was uncomplicated and patient was transferred to  post procedure. Post operative course complicated by persistent air leak that slowly resolved, Anterior chest tube was removed POD#3 and posterior chest tube as removed POD#5 without any complications. Post chest tube removal CXR on POD#5 showed slight increase in air space. Patient remained hemodynamically stable and asymptomatic. As per Dr. Poe patient is stable and ready for discharge home on POD#5.

## 2017-06-18 NOTE — DISCHARGE NOTE ADULT - MEDICATION SUMMARY - MEDICATIONS TO TAKE
I will START or STAY ON the medications listed below when I get home from the hospital:    acetaminophen 325 mg oral tablet  -- 2 tab(s) by mouth every 6 hours, As needed, Mild Pain (1 - 3)  -- Indication: For As needed for mild pain    oxyCODONE 5 mg oral tablet  -- 1- 2 tab(s) by mouth every 6 hours, As Needed, Moderate Pain - Severe Pain (4 - 6) MDD:8  -- Indication: For As needed for moderate (1 tablet) to severe pain (2 tablets)     losartan 25 mg oral tablet  --  by mouth once a day  -- Indication: For Blood pressure    Crestor 10 mg oral tablet  -- 1 tab(s) by mouth once a day (at bedtime)  -- Indication: For Cholesterol    docusate sodium 100 mg oral capsule  -- 1 cap(s) by mouth 3 times a day  -- Indication: For Stool softener    polyethylene glycol 3350 oral powder for reconstitution  -- 17 gram(s) by mouth once a day, As needed, Constipation  -- Indication: For As needed for constipation    senna oral tablet  -- 2 tab(s) by mouth once a day (at bedtime)  -- Indication: For Stool softner     Synthroid 200 mcg (0.2 mg) oral tablet  -- 1 tab(s) by mouth once a day  -- Indication: For Hypothyroid

## 2017-06-21 ENCOUNTER — FORM ENCOUNTER (OUTPATIENT)
Age: 67
End: 2017-06-21
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
## 2017-06-21 DIAGNOSIS — R91.8 OTHER NONSPECIFIC ABNORMAL FINDING OF LUNG FIELD: ICD-10-CM

## 2017-06-21 DIAGNOSIS — J95.812 POSTPROCEDURAL AIR LEAK: ICD-10-CM

## 2017-06-21 DIAGNOSIS — J90 PLEURAL EFFUSION, NOT ELSEWHERE CLASSIFIED: ICD-10-CM

## 2017-06-21 DIAGNOSIS — E78.5 HYPERLIPIDEMIA, UNSPECIFIED: ICD-10-CM

## 2017-06-21 DIAGNOSIS — E03.9 HYPOTHYROIDISM, UNSPECIFIED: ICD-10-CM

## 2017-06-21 DIAGNOSIS — I10 ESSENTIAL (PRIMARY) HYPERTENSION: ICD-10-CM

## 2017-06-21 DIAGNOSIS — Q27.39 ARTERIOVENOUS MALFORMATION, OTHER SITE: ICD-10-CM

## 2017-06-22 ENCOUNTER — OUTPATIENT (OUTPATIENT)
Dept: OUTPATIENT SERVICES | Facility: HOSPITAL | Age: 67
LOS: 1 days | End: 2017-06-22
Payer: COMMERCIAL

## 2017-06-22 ENCOUNTER — APPOINTMENT (OUTPATIENT)
Dept: THORACIC SURGERY | Facility: CLINIC | Age: 67
End: 2017-06-22

## 2017-06-22 VITALS
DIASTOLIC BLOOD PRESSURE: 69 MMHG | TEMPERATURE: 97.5 F | OXYGEN SATURATION: 96 % | RESPIRATION RATE: 18 BRPM | WEIGHT: 179 LBS | HEART RATE: 71 BPM | SYSTOLIC BLOOD PRESSURE: 113 MMHG | BODY MASS INDEX: 24.28 KG/M2

## 2017-06-22 DIAGNOSIS — Z00.00 ENCOUNTER FOR GENERAL ADULT MEDICAL EXAMINATION W/OUT ABNORMAL FINDINGS: ICD-10-CM

## 2017-06-22 DIAGNOSIS — Z96.641 PRESENCE OF RIGHT ARTIFICIAL HIP JOINT: Chronic | ICD-10-CM

## 2017-06-22 DIAGNOSIS — Z98.890 OTHER SPECIFIED POSTPROCEDURAL STATES: Chronic | ICD-10-CM

## 2017-06-22 PROCEDURE — 71020: CPT | Mod: 26

## 2017-06-22 PROCEDURE — 71046 X-RAY EXAM CHEST 2 VIEWS: CPT

## 2017-06-22 RX ORDER — ROSUVASTATIN CALCIUM 10 MG/1
10 TABLET, FILM COATED ORAL
Refills: 0 | Status: ACTIVE | COMMUNITY

## 2017-06-22 RX ORDER — LOSARTAN POTASSIUM 25 MG/1
25 TABLET, FILM COATED ORAL DAILY
Refills: 0 | Status: ACTIVE | COMMUNITY

## 2017-06-22 RX ORDER — LEVOTHYROXINE SODIUM 200 UG/1
200 TABLET ORAL DAILY
Refills: 0 | Status: ACTIVE | COMMUNITY

## 2017-06-27 RX ORDER — LOSARTAN POTASSIUM 100 MG/1
0 TABLET, FILM COATED ORAL
Qty: 0 | Refills: 0 | COMMUNITY

## 2017-06-27 RX ORDER — LEVOTHYROXINE SODIUM 125 MCG
1 TABLET ORAL
Qty: 0 | Refills: 0 | COMMUNITY

## 2017-06-27 RX ORDER — ROSUVASTATIN CALCIUM 5 MG/1
1 TABLET ORAL
Qty: 0 | Refills: 0 | COMMUNITY

## 2017-07-12 ENCOUNTER — FORM ENCOUNTER (OUTPATIENT)
Age: 67
End: 2017-07-12

## 2017-07-13 ENCOUNTER — APPOINTMENT (OUTPATIENT)
Dept: THORACIC SURGERY | Facility: CLINIC | Age: 67
End: 2017-07-13

## 2017-07-13 ENCOUNTER — OUTPATIENT (OUTPATIENT)
Dept: OUTPATIENT SERVICES | Facility: HOSPITAL | Age: 67
LOS: 1 days | End: 2017-07-13
Payer: MEDICARE

## 2017-07-13 VITALS
HEART RATE: 66 BPM | OXYGEN SATURATION: 98 % | BODY MASS INDEX: 24.41 KG/M2 | SYSTOLIC BLOOD PRESSURE: 109 MMHG | DIASTOLIC BLOOD PRESSURE: 71 MMHG | WEIGHT: 180 LBS | RESPIRATION RATE: 17 BRPM | TEMPERATURE: 97.1 F

## 2017-07-13 DIAGNOSIS — Z98.890 OTHER SPECIFIED POSTPROCEDURAL STATES: Chronic | ICD-10-CM

## 2017-07-13 DIAGNOSIS — R91.1 SOLITARY PULMONARY NODULE: ICD-10-CM

## 2017-07-13 DIAGNOSIS — Z87.09 PERSONAL HISTORY OF OTHER DISEASES OF THE RESPIRATORY SYSTEM: ICD-10-CM

## 2017-07-13 DIAGNOSIS — Z09 ENCOUNTER FOR FOLLOW-UP EXAMINATION AFTER COMPLETED TREATMENT FOR CONDITIONS OTHER THAN MALIGNANT NEOPLASM: ICD-10-CM

## 2017-07-13 DIAGNOSIS — Z96.641 PRESENCE OF RIGHT ARTIFICIAL HIP JOINT: Chronic | ICD-10-CM

## 2017-07-13 DIAGNOSIS — Q60.0 RENAL AGENESIS, UNILATERAL: ICD-10-CM

## 2017-07-13 DIAGNOSIS — J90 PLEURAL EFFUSION, NOT ELSEWHERE CLASSIFIED: ICD-10-CM

## 2017-07-13 PROBLEM — I10 ESSENTIAL (PRIMARY) HYPERTENSION: Chronic | Status: ACTIVE | Noted: 2017-06-12

## 2017-07-13 PROBLEM — E78.5 HYPERLIPIDEMIA, UNSPECIFIED: Chronic | Status: ACTIVE | Noted: 2017-06-12

## 2017-07-13 PROCEDURE — 71046 X-RAY EXAM CHEST 2 VIEWS: CPT

## 2017-07-13 PROCEDURE — 71020: CPT | Mod: 26

## 2017-10-23 NOTE — CONSULT NOTE ADULT - PROBLEM SELECTOR RECOMMENDATION 9
Adilia Villa is here today for Preventative Care (New PT )    Concerns/symptoms: None   Medications: currently is not taking any medications  Refills needed today? No     Tobacco history: verified  Advanced Directives: No not on file, forms/info. given to patient.  BP greater than 140/90? No    Patient would like communication of their results via:    Cell Phone:   Telephone Information:   Mobile [phone]     Okay to leave a message containing results? Yes  Preferred language:  English.    Health Maintenance Due   Topic Date Due   • DTaP/Tdap/Td Vaccine (1 - Tdap) 09/08/1998   • Influenza Vaccine (1) 09/01/2017      Patient is due for topics as listed above, he wishes to proceed at this time, order (s) placed and patient given information.      Flu Questionnaire      1.  Does you have a moderate to severe fever?  []  Yes  [x]  No    2.  Have you had a serious reaction to a flu shot before?   []  Yes  [x]  No    3.  Have you ever had Guillian [place] Syndrome within 6 weeks of a previous flu shot?     []  Yes   [x]  No    4.  Do you have a serious allergy to eggs?  []  Yes   [x]  No    5.  If person is answering for a child, is the child less that 6 months of age? []  Yes  [x] No      A \"YES\" answer to any question above means the patient is not eligible to receive the vaccine.  Vaccine Information Statement(s) given and reviewed, questions answered. Patient tolerated without incident.        the patient had thoracentesis with evacuation of the fluid. The fluid was serosanguineous and await the fluid analysis. My differential diagnosis is either, but not limited to, asbestos related pleural disease, or pulmonary malignancy. Patient is scheduled for VATS, pleural biopsy, lung biopsy, and decortication.  the patient worked as a  and could have been exposed to asbestos

## 2017-12-20 ENCOUNTER — OUTPATIENT (OUTPATIENT)
Dept: OUTPATIENT SERVICES | Facility: HOSPITAL | Age: 67
LOS: 1 days | End: 2017-12-20
Payer: MEDICARE

## 2017-12-20 DIAGNOSIS — Z98.890 OTHER SPECIFIED POSTPROCEDURAL STATES: Chronic | ICD-10-CM

## 2017-12-20 DIAGNOSIS — Z96.641 PRESENCE OF RIGHT ARTIFICIAL HIP JOINT: Chronic | ICD-10-CM

## 2017-12-20 PROCEDURE — 71250 CT THORAX DX C-: CPT | Mod: 26

## 2017-12-20 PROCEDURE — 71250 CT THORAX DX C-: CPT

## 2018-12-17 PROBLEM — Q27.30 ARTERIOVENOUS MALFORMATION, SITE UNSPECIFIED: Chronic | Status: ACTIVE | Noted: 2017-06-12

## 2018-12-17 PROBLEM — M25.551 PAIN IN RIGHT HIP: Chronic | Status: ACTIVE | Noted: 2017-06-12

## 2019-01-08 ENCOUNTER — FORM ENCOUNTER (OUTPATIENT)
Age: 69
End: 2019-01-08

## 2019-01-09 ENCOUNTER — APPOINTMENT (OUTPATIENT)
Dept: CT IMAGING | Facility: HOSPITAL | Age: 69
End: 2019-01-09
Payer: MEDICARE

## 2019-01-09 ENCOUNTER — OUTPATIENT (OUTPATIENT)
Dept: OUTPATIENT SERVICES | Facility: HOSPITAL | Age: 69
LOS: 1 days | End: 2019-01-09
Payer: MEDICARE

## 2019-01-09 DIAGNOSIS — Z96.641 PRESENCE OF RIGHT ARTIFICIAL HIP JOINT: Chronic | ICD-10-CM

## 2019-01-09 DIAGNOSIS — Z98.890 OTHER SPECIFIED POSTPROCEDURAL STATES: Chronic | ICD-10-CM

## 2019-01-09 PROCEDURE — 71250 CT THORAX DX C-: CPT | Mod: 26

## 2019-01-09 PROCEDURE — 71250 CT THORAX DX C-: CPT

## 2023-04-10 NOTE — PATIENT PROFILE ADULT. - NS PRO MODE OF ARRIVAL
stretcher Detail Level: Detailed Size Of Lesion In Cm (Optional): 0 Size Of Lesion In Cm (Optional): 0.7

## 2023-11-14 NOTE — PRE-OP CHECKLIST - PATIENT SENT AT

## 2024-01-15 NOTE — ED ADULT NURSE NOTE - MUSCULOSKELETAL ASSESSMENT
"Oncology Rooming Note    January 15, 2024 12:57 PM   Sage Jones is a 75 year old male who presents for:    Chief Complaint   Patient presents with    Oncology Clinic Visit     Primary malignant neoplasm of prostate (H)     Initial Vitals: /75 (BP Location: Left arm, Patient Position: Sitting, Cuff Size: Adult Large)   Pulse 64   Temp 97.6  F (36.4  C) (Oral)   Resp 18   Wt 97.5 kg (214 lb 14.4 oz)   SpO2 96%   BMI 31.74 kg/m   Estimated body mass index is 31.74 kg/m  as calculated from the following:    Height as of 8/22/23: 1.753 m (5' 9\").    Weight as of this encounter: 97.5 kg (214 lb 14.4 oz). Body surface area is 2.18 meters squared.  Extreme Pain (8) Comment: Data Unavailable   No LMP for male patient.  Allergies reviewed: Yes  Medications reviewed: Yes    Medications: Medication refills not needed today.  Pharmacy name entered into [facility]:    [facility] [address] [place], [place] - [address] [id number] [place], [place] - [address] [facility]    Frailty Screening:   Is the patient here for a new oncology consult visit in cancer care? 2. No      Clinical concerns: Would like to go off cancer medication   was notified.      Eva Elena LPN            " WDL

## 2024-01-24 ENCOUNTER — APPOINTMENT (OUTPATIENT)
Dept: VASCULAR SURGERY | Facility: CLINIC | Age: 74
End: 2024-01-24

## 2024-03-08 NOTE — PATIENT PROFILE ADULT. - ALCOHOL USE HISTORY SINGLE SELECT
PCP: No primary care provider on file.    Last appt: 8/21/2023    No future appointments.    Requested Prescriptions     Pending Prescriptions Disp Refills    pravastatin (PRAVACHOL) 40 MG tablet [Pharmacy Med Name: PRAVASTATIN SODIUM 40 MG TAB] 30 tablet 0     Sig: TAKE 1 TABLET BY MOUTH EVERY DAY    zolpidem (AMBIEN CR) 12.5 MG extended release tablet [Pharmacy Med Name: ZOLPIDEM TART ER 12.5 MG TAB] 30 tablet 0     Sig: TAKE 1 TABLET BY MOUTH NIGHTLY AS NEEDED FOR SLEEP FOR UP TO 90 DAYS. MAX DAILY: 12.5 MG       
yes...
